# Patient Record
Sex: FEMALE | Race: WHITE | NOT HISPANIC OR LATINO | Employment: UNEMPLOYED | URBAN - METROPOLITAN AREA
[De-identification: names, ages, dates, MRNs, and addresses within clinical notes are randomized per-mention and may not be internally consistent; named-entity substitution may affect disease eponyms.]

---

## 2024-01-29 ENCOUNTER — LAB VISIT (OUTPATIENT)
Dept: LAB | Facility: HOSPITAL | Age: 29
End: 2024-01-29
Attending: MIDWIFE
Payer: MEDICAID

## 2024-01-29 ENCOUNTER — OFFICE VISIT (OUTPATIENT)
Dept: OBSTETRICS AND GYNECOLOGY | Facility: CLINIC | Age: 29
End: 2024-01-29
Payer: MEDICAID

## 2024-01-29 VITALS
BODY MASS INDEX: 21.28 KG/M2 | HEIGHT: 66 IN | SYSTOLIC BLOOD PRESSURE: 92 MMHG | DIASTOLIC BLOOD PRESSURE: 61 MMHG | WEIGHT: 132.38 LBS

## 2024-01-29 DIAGNOSIS — Z32.01 POSITIVE PREGNANCY TEST: ICD-10-CM

## 2024-01-29 DIAGNOSIS — Z32.01 POSITIVE PREGNANCY TEST: Primary | ICD-10-CM

## 2024-01-29 DIAGNOSIS — O34.219 PREVIOUS CESAREAN DELIVERY AFFECTING PREGNANCY: ICD-10-CM

## 2024-01-29 LAB
ABO + RH BLD: NORMAL
B-HCG UR QL: POSITIVE
BASOPHILS # BLD AUTO: 0.02 K/UL (ref 0–0.2)
BASOPHILS NFR BLD: 0.3 % (ref 0–1.9)
BLD GP AB SCN CELLS X3 SERPL QL: NORMAL
CTP QC/QA: YES
DIFFERENTIAL METHOD BLD: NORMAL
EOSINOPHIL # BLD AUTO: 0.1 K/UL (ref 0–0.5)
EOSINOPHIL NFR BLD: 0.8 % (ref 0–8)
ERYTHROCYTE [DISTWIDTH] IN BLOOD BY AUTOMATED COUNT: 12.3 % (ref 11.5–14.5)
HAV IGM SERPL QL IA: NORMAL
HBV CORE IGM SERPL QL IA: NORMAL
HBV SURFACE AG SERPL QL IA: NORMAL
HCT VFR BLD AUTO: 38.7 % (ref 37–48.5)
HCV AB SERPL QL IA: NORMAL
HGB BLD-MCNC: 12.4 G/DL (ref 12–16)
HIV 1+2 AB+HIV1 P24 AG SERPL QL IA: NORMAL
IMM GRANULOCYTES # BLD AUTO: 0.02 K/UL (ref 0–0.04)
IMM GRANULOCYTES NFR BLD AUTO: 0.3 % (ref 0–0.5)
LYMPHOCYTES # BLD AUTO: 1.7 K/UL (ref 1–4.8)
LYMPHOCYTES NFR BLD: 28.2 % (ref 18–48)
MCH RBC QN AUTO: 28.2 PG (ref 27–31)
MCHC RBC AUTO-ENTMCNC: 32 G/DL (ref 32–36)
MCV RBC AUTO: 88 FL (ref 82–98)
MONOCYTES # BLD AUTO: 0.5 K/UL (ref 0.3–1)
MONOCYTES NFR BLD: 8.1 % (ref 4–15)
NEUTROPHILS # BLD AUTO: 3.7 K/UL (ref 1.8–7.7)
NEUTROPHILS NFR BLD: 62.3 % (ref 38–73)
NRBC BLD-RTO: 0 /100 WBC
PLATELET # BLD AUTO: 211 K/UL (ref 150–450)
PMV BLD AUTO: 11.1 FL (ref 9.2–12.9)
RBC # BLD AUTO: 4.39 M/UL (ref 4–5.4)
SPECIMEN OUTDATE: NORMAL
WBC # BLD AUTO: 5.93 K/UL (ref 3.9–12.7)

## 2024-01-29 PROCEDURE — 3074F SYST BP LT 130 MM HG: CPT | Mod: CPTII,,, | Performed by: MIDWIFE

## 2024-01-29 PROCEDURE — 99204 OFFICE O/P NEW MOD 45 MIN: CPT | Mod: TH,S$PBB,, | Performed by: MIDWIFE

## 2024-01-29 PROCEDURE — 86762 RUBELLA ANTIBODY: CPT | Performed by: MIDWIFE

## 2024-01-29 PROCEDURE — 87491 CHLMYD TRACH DNA AMP PROBE: CPT | Performed by: MIDWIFE

## 2024-01-29 PROCEDURE — 3008F BODY MASS INDEX DOCD: CPT | Mod: CPTII,,, | Performed by: MIDWIFE

## 2024-01-29 PROCEDURE — 99999 PR PBB SHADOW E&M-NEW PATIENT-LVL II: CPT | Mod: PBBFAC,,, | Performed by: MIDWIFE

## 2024-01-29 PROCEDURE — 83020 HEMOGLOBIN ELECTROPHORESIS: CPT | Performed by: MIDWIFE

## 2024-01-29 PROCEDURE — 1159F MED LIST DOCD IN RCRD: CPT | Mod: CPTII,,, | Performed by: MIDWIFE

## 2024-01-29 PROCEDURE — 99202 OFFICE O/P NEW SF 15 MIN: CPT | Mod: PBBFAC,TH,PN | Performed by: MIDWIFE

## 2024-01-29 PROCEDURE — 87086 URINE CULTURE/COLONY COUNT: CPT | Performed by: MIDWIFE

## 2024-01-29 PROCEDURE — 80074 ACUTE HEPATITIS PANEL: CPT | Performed by: MIDWIFE

## 2024-01-29 PROCEDURE — 86592 SYPHILIS TEST NON-TREP QUAL: CPT | Performed by: MIDWIFE

## 2024-01-29 PROCEDURE — 85025 COMPLETE CBC W/AUTO DIFF WBC: CPT | Performed by: MIDWIFE

## 2024-01-29 PROCEDURE — 99999PBSHW POCT URINE PREGNANCY: Mod: PBBFAC,,,

## 2024-01-29 PROCEDURE — 81025 URINE PREGNANCY TEST: CPT | Mod: PBBFAC,PN | Performed by: MIDWIFE

## 2024-01-29 PROCEDURE — 86850 RBC ANTIBODY SCREEN: CPT | Performed by: MIDWIFE

## 2024-01-29 PROCEDURE — 87389 HIV-1 AG W/HIV-1&-2 AB AG IA: CPT | Performed by: MIDWIFE

## 2024-01-29 PROCEDURE — 3078F DIAST BP <80 MM HG: CPT | Mod: CPTII,,, | Performed by: MIDWIFE

## 2024-01-29 RX ORDER — ONDANSETRON 4 MG/1
4-8 TABLET, ORALLY DISINTEGRATING ORAL EVERY 6 HOURS PRN
Qty: 30 TABLET | Refills: 0 | Status: SHIPPED | OUTPATIENT
Start: 2024-01-29 | End: 2024-05-03

## 2024-01-29 NOTE — PROGRESS NOTES
"CHIEF COMPLAINT:   Patient presents with      Possible Pregnancy        HISTORY OF PRESENT ILLNESS  Rosemary Montes De Oca 28 y.o.  presents for pregnancy risk assessment.   The patient has no complaints today.  Mild nausea/vomiting. No bleeding or pain.  Pregnancy was planned and is desired.   "Antwan" is supportive of pregnancy.  Lives at home with her  and their daughter.  No pets at home.  Works at [employer], currently. 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  Denies domestic abuse.  Denies chemical/pesticide/radiation exposure.  OB history:  C/s x1- cholestasis and breech presentation    LMP: Patient's last menstrual period was 12/15/2023.  EDC: Estimated Date of Delivery: None noted.  EGA: 6w5d per LMP      Health Maintenance   Topic Date Due    Hepatitis C Screening  Never done    Lipid Panel  Never done    HPV Vaccines (1 - 2-dose series) Never done    TETANUS VACCINE  Never done    Pap Smear  Never done       History reviewed. No pertinent past medical history.    Past Surgical History:   Procedure Laterality Date     SECTION         Family History   Problem Relation Age of Onset    Diabetes Paternal Grandmother     Heart failure Paternal Grandmother     Cancer Maternal Grandmother        Social History     Socioeconomic History    Marital status:    Tobacco Use    Smoking status: Never    Smokeless tobacco: Never   Substance and Sexual Activity    Alcohol use: Not Currently    Drug use: Never    Sexual activity: Yes     Partners: Male     Birth control/protection: None       Current Outpatient Medications   Medication Sig Dispense Refill    PNV no.153/FA/om3/dha/epa/fish (PRENATAL GUMMIES ORAL) Take by mouth.       No current facility-administered medications for this visit.       Review of patient's allergies indicates:  No Known Allergies      PHYSICAL EXAM   Vitals:    24 1410   BP: 92/61        PAIN SCALE: 0/10 None    PHYSICAL EXAM    ROS:  GENERAL: No fever, chills, " fatigability or weight loss.  CV: Denies chest pain  PULM: Denies shortness of breath or wheezing.  ABDOMEN: Appetite fine. No weight loss. Denies diarrhea, abdominal pain, hematemesis or blood in stool.  URINARY: No flank pain, dysuria or hematuria.  REPRODUCTIVE: No abnormal vaginal bleeding.       PE:   APPEARANCE: Well nourished, well developed, in no acute distress  CHEST: Clear to auscultation bilaterally  CV: Regular rate and rhythm  BREASTS: Symmetrical, no skin changes or visible lesions. No palpable masses, nipple discharge or adenopathy bilaterally.  ABDOMEN: Soft. No tenderness or masses. No hepatosplenomegaly. No hernias  PELVIC:   VULVA: No lesions. Normal female genitalia.  URETHRAL MEATUS: Normal size and location, no lesions, no prolapse.  URETHRA: No masses, tenderness, prolapse or scarring.  VAGINA: Moist and well rugated, no discharge, no significant cystocele or rectocele.  CERVIX: No lesions, normal diameter, no stenosis, no cervical motion tenderness.   UTERUS: appropriate to gestational size, regular shape, mobile, non-tender, normal position, good support.  ADNEXA: No masses, tenderness or CDS nodularity.  ANUS PERINEUM: No lesions, no relaxation, no external hemorrhoids.     UPT +    A/P:     -      Patient was counseled today on A.C.S. Pap guidelines and recommendations for yearly pelvic exams, mammograms and monthly self breast exams; to see her PCP for other health maintenance and pregnancy. Last pap smear in August 2021 with beginning of last pregnancy, NILM per pt. Will collect pap smear postpartum.   -      Patient's medications and medical history reviewed with patient and implications in pregnancy.   Previous c/s x1- DORIS obtained from [facility] in [place], [place] with Dr. Cassidy Lopez   -      Pregnancy course discussed and 'AtoZ' book given.   -     Oriented to practice including CNM collaboration.   -     New OB labs today  - RTC x 2 weeks for initial OB and dating US

## 2024-01-30 LAB
BACTERIA UR CULT: NORMAL
BACTERIA UR CULT: NORMAL
HGB A2 MFR BLD HPLC: 2.6 % (ref 2.2–3.2)
HGB FRACT BLD ELPH-IMP: NORMAL
HGB FRACT BLD ELPH-IMP: NORMAL
RPR SER QL: NORMAL
RUBV IGG SER-ACNC: 16.3 IU/ML
RUBV IGG SER-IMP: REACTIVE

## 2024-02-02 LAB
C TRACH DNA SPEC QL NAA+PROBE: NOT DETECTED
N GONORRHOEA DNA SPEC QL NAA+PROBE: NOT DETECTED

## 2024-02-14 ENCOUNTER — PROCEDURE VISIT (OUTPATIENT)
Dept: OBSTETRICS AND GYNECOLOGY | Facility: CLINIC | Age: 29
End: 2024-02-14
Payer: MEDICAID

## 2024-02-14 ENCOUNTER — INITIAL PRENATAL (OUTPATIENT)
Dept: OBSTETRICS AND GYNECOLOGY | Facility: CLINIC | Age: 29
End: 2024-02-14
Payer: MEDICAID

## 2024-02-14 VITALS
SYSTOLIC BLOOD PRESSURE: 109 MMHG | DIASTOLIC BLOOD PRESSURE: 63 MMHG | WEIGHT: 134.25 LBS | BODY MASS INDEX: 21.67 KG/M2

## 2024-02-14 DIAGNOSIS — Z32.01 POSITIVE PREGNANCY TEST: ICD-10-CM

## 2024-02-14 DIAGNOSIS — O34.219 PREVIOUS CESAREAN DELIVERY AFFECTING PREGNANCY: Primary | ICD-10-CM

## 2024-02-14 PROCEDURE — 99211 OFF/OP EST MAY X REQ PHY/QHP: CPT | Mod: PBBFAC,25,TH,PN | Performed by: MIDWIFE

## 2024-02-14 PROCEDURE — 99999 PR PBB SHADOW E&M-EST. PATIENT-LVL I: CPT | Mod: PBBFAC,,, | Performed by: MIDWIFE

## 2024-02-14 PROCEDURE — 99212 OFFICE O/P EST SF 10 MIN: CPT | Mod: TH,S$PBB,, | Performed by: MIDWIFE

## 2024-02-14 PROCEDURE — 76801 OB US < 14 WKS SINGLE FETUS: CPT | Mod: PBBFAC,PN | Performed by: OBSTETRICS & GYNECOLOGY

## 2024-02-14 NOTE — PROGRESS NOTES
28 y.o. female  at 9w0d   Pt here for initial OB and dating US   Denies VB, LOF or cramping  Doing well without concerns, still with some first trimester symptoms but coping well     Previous  delivery affecting pregnancy  -Prenatal labs reviewed   - DORIS not received, will re-fax   - would like TOLAC-once op report received, will review with MD   - routine PNC  -genetic testing declined today        Reviewed warning signs, pregnancy precautions and how/when to call.  RTC x 4 wks, call or present sooner prn.

## 2024-03-12 ENCOUNTER — PATIENT MESSAGE (OUTPATIENT)
Dept: ADMINISTRATIVE | Facility: OTHER | Age: 29
End: 2024-03-12
Payer: MEDICAID

## 2024-03-12 ENCOUNTER — ROUTINE PRENATAL (OUTPATIENT)
Dept: OBSTETRICS AND GYNECOLOGY | Facility: CLINIC | Age: 29
End: 2024-03-12
Payer: MEDICAID

## 2024-03-12 VITALS
WEIGHT: 135.94 LBS | BODY MASS INDEX: 21.94 KG/M2 | DIASTOLIC BLOOD PRESSURE: 60 MMHG | SYSTOLIC BLOOD PRESSURE: 100 MMHG

## 2024-03-12 DIAGNOSIS — O34.219 PREVIOUS CESAREAN DELIVERY AFFECTING PREGNANCY: Primary | ICD-10-CM

## 2024-03-12 PROCEDURE — 99999 PR PBB SHADOW E&M-EST. PATIENT-LVL II: CPT | Mod: PBBFAC,,, | Performed by: MIDWIFE

## 2024-03-12 PROCEDURE — 99212 OFFICE O/P EST SF 10 MIN: CPT | Mod: PBBFAC,TH,PN | Performed by: MIDWIFE

## 2024-03-12 PROCEDURE — 99213 OFFICE O/P EST LOW 20 MIN: CPT | Mod: TH,S$PBB,, | Performed by: MIDWIFE

## 2024-03-12 NOTE — Clinical Note
Please review for TOLAC approval. Previous c/s x1 for cholestasis and breech presentation. Delivery record in media.  Lise

## 2024-03-12 NOTE — PROGRESS NOTES
28 y.o. female  at 12w6d   Denies VB, LOF or cramping  Doing well without concerns, nausea has been easing up the past few days   TW.5 lbs     Previous  delivery affecting pregnancy  -   desires TOLAC, records from previous c/s received and uploaded to media, will send to lester BAIRES for TOLAC approval.   - routine PNC  -signed up for [employer] today          Reviewed warning signs, pregnancy precautions and how/when to call.  RTC x 4 wks, call or present sooner prn.

## 2024-04-03 ENCOUNTER — PATIENT MESSAGE (OUTPATIENT)
Dept: OTHER | Facility: OTHER | Age: 29
End: 2024-04-03
Payer: MEDICAID

## 2024-04-10 ENCOUNTER — TELEPHONE (OUTPATIENT)
Dept: OBSTETRICS AND GYNECOLOGY | Facility: CLINIC | Age: 29
End: 2024-04-10
Payer: MEDICAID

## 2024-04-10 ENCOUNTER — PATIENT MESSAGE (OUTPATIENT)
Dept: OTHER | Facility: OTHER | Age: 29
End: 2024-04-10
Payer: MEDICAID

## 2024-04-10 NOTE — TELEPHONE ENCOUNTER
----- Message from Pastora Parker sent at 4/10/2024  8:46 AM CDT -----  Contact: Rosemary Riley is calling to speak to the nurse regarding her scheduled appointment for today 04/10, please give her a call at     Thanks  LJ

## 2024-04-16 ENCOUNTER — ROUTINE PRENATAL (OUTPATIENT)
Dept: OBSTETRICS AND GYNECOLOGY | Facility: CLINIC | Age: 29
End: 2024-04-16
Payer: MEDICAID

## 2024-04-16 VITALS
DIASTOLIC BLOOD PRESSURE: 58 MMHG | SYSTOLIC BLOOD PRESSURE: 108 MMHG | BODY MASS INDEX: 23.36 KG/M2 | WEIGHT: 144.75 LBS

## 2024-04-16 DIAGNOSIS — O34.219 PREVIOUS CESAREAN DELIVERY AFFECTING PREGNANCY: Primary | ICD-10-CM

## 2024-04-16 DIAGNOSIS — Z36.89 ENCOUNTER FOR FETAL ANATOMIC SURVEY: ICD-10-CM

## 2024-04-16 PROCEDURE — 99999 PR PBB SHADOW E&M-EST. PATIENT-LVL II: CPT | Mod: PBBFAC,,, | Performed by: MIDWIFE

## 2024-04-16 PROCEDURE — 99213 OFFICE O/P EST LOW 20 MIN: CPT | Mod: TH,S$PBB,, | Performed by: MIDWIFE

## 2024-04-16 PROCEDURE — 99212 OFFICE O/P EST SF 10 MIN: CPT | Mod: PBBFAC,TH,PN | Performed by: MIDWIFE

## 2024-04-16 NOTE — PROGRESS NOTES
28 y.o. female  at 17w6d  Denies VB, LOF or cramping  Denies pregnancy concerns/complaints.   C/o painful intercourse, has been since delivery of her first child. Temporarily improved with cessation of breast feeding, but since then, has returned. Does report lack of moisture. Recommendations including use of personal lubricant or coconut oil. May place referral to pelvic floor therapy if problems persist   TW.5 lbs      Previous  delivery affecting pregnancy  -   desires TOLAC, records received included a delivery summary, however not the operative report from previous OBGYN. Will re-fax requesting specifically op report.   - plan for TOLAC consult with MD at 32w  -continue routine PNC  -anatomy US in 2 weeks            Reviewed warning signs, pregnancy precautions and how/when to call.  RTC x 2 wks, call or present sooner prn.

## 2024-05-01 ENCOUNTER — PATIENT MESSAGE (OUTPATIENT)
Dept: OTHER | Facility: OTHER | Age: 29
End: 2024-05-01
Payer: MEDICAID

## 2024-05-03 ENCOUNTER — PROCEDURE VISIT (OUTPATIENT)
Dept: OBSTETRICS AND GYNECOLOGY | Facility: CLINIC | Age: 29
End: 2024-05-03
Payer: MEDICAID

## 2024-05-03 ENCOUNTER — ROUTINE PRENATAL (OUTPATIENT)
Dept: OBSTETRICS AND GYNECOLOGY | Facility: CLINIC | Age: 29
End: 2024-05-03
Payer: MEDICAID

## 2024-05-03 VITALS
DIASTOLIC BLOOD PRESSURE: 60 MMHG | WEIGHT: 146.38 LBS | BODY MASS INDEX: 23.63 KG/M2 | SYSTOLIC BLOOD PRESSURE: 108 MMHG

## 2024-05-03 DIAGNOSIS — Z32.01 POSITIVE PREGNANCY TEST: ICD-10-CM

## 2024-05-03 DIAGNOSIS — O34.219 PREVIOUS CESAREAN DELIVERY AFFECTING PREGNANCY: Primary | ICD-10-CM

## 2024-05-03 DIAGNOSIS — Z87.59 HISTORY OF CHOLESTASIS DURING PREGNANCY: ICD-10-CM

## 2024-05-03 DIAGNOSIS — Z87.19 HISTORY OF CHOLESTASIS DURING PREGNANCY: ICD-10-CM

## 2024-05-03 PROCEDURE — 99213 OFFICE O/P EST LOW 20 MIN: CPT | Mod: TH,S$PBB,, | Performed by: MIDWIFE

## 2024-05-03 PROCEDURE — 76805 OB US >/= 14 WKS SNGL FETUS: CPT | Mod: PBBFAC,PN | Performed by: OBSTETRICS & GYNECOLOGY

## 2024-05-03 PROCEDURE — 99999 PR PBB SHADOW E&M-EST. PATIENT-LVL II: CPT | Mod: PBBFAC,,, | Performed by: MIDWIFE

## 2024-05-03 PROCEDURE — 99212 OFFICE O/P EST SF 10 MIN: CPT | Mod: PBBFAC,TH,PN,25 | Performed by: MIDWIFE

## 2024-05-03 NOTE — PROGRESS NOTES
28 y.o. female  at 20w2d   +FM, denies VB, LOF or cramping  Doing well without concerns.  TW lbs     Previous  delivery affecting pregnancy  -   desires TOLAC, records received included a delivery summary, however not the operative report from previous OBGYN. Re-faxed DORIS requesting specifically op report, but still not received  - plan for TOLAC consult with MD at 32w  -continue routine PNC  -anatomy US today, cephalic presentation, posterior placenta, placental edge to cervical os distance 87mm, 3vc, normal cord insertion, EFW 13oz, 33%ile, cervical length 34mm, fetal anatomic survey complete per MFM impression     History of cholestasis during pregnancy  - continue to monitor for symptoms this pregnancy        Reviewed warning signs, normal FM,  labor precautions and how/when to call.  RTC x 4 wks, call or present sooner prn.

## 2024-05-29 ENCOUNTER — PATIENT MESSAGE (OUTPATIENT)
Dept: OTHER | Facility: OTHER | Age: 29
End: 2024-05-29
Payer: MEDICAID

## 2024-05-30 ENCOUNTER — ROUTINE PRENATAL (OUTPATIENT)
Dept: OBSTETRICS AND GYNECOLOGY | Facility: CLINIC | Age: 29
End: 2024-05-30
Payer: MEDICAID

## 2024-05-30 VITALS
SYSTOLIC BLOOD PRESSURE: 114 MMHG | DIASTOLIC BLOOD PRESSURE: 64 MMHG | BODY MASS INDEX: 24.94 KG/M2 | WEIGHT: 154.56 LBS

## 2024-05-30 DIAGNOSIS — Z87.19 HISTORY OF CHOLESTASIS DURING PREGNANCY: ICD-10-CM

## 2024-05-30 DIAGNOSIS — O34.219 PREVIOUS CESAREAN DELIVERY AFFECTING PREGNANCY: Primary | ICD-10-CM

## 2024-05-30 DIAGNOSIS — Z87.59 HISTORY OF CHOLESTASIS DURING PREGNANCY: ICD-10-CM

## 2024-05-30 PROCEDURE — 99212 OFFICE O/P EST SF 10 MIN: CPT | Mod: PBBFAC,TH,PN | Performed by: MIDWIFE

## 2024-05-30 PROCEDURE — 99999 PR PBB SHADOW E&M-EST. PATIENT-LVL II: CPT | Mod: PBBFAC,,, | Performed by: MIDWIFE

## 2024-05-30 PROCEDURE — 99213 OFFICE O/P EST LOW 20 MIN: CPT | Mod: TH,S$PBB,, | Performed by: MIDWIFE

## 2024-05-30 NOTE — PROGRESS NOTES
28 y.o. female  at 24w1d   +FM, denies VB, LOF or cramping  Doing well without concerns.  TW lbs      Previous  delivery affecting pregnancy  -   desires TOLAC, records received included a delivery summary, however not the operative report from previous OBGYN. Re-faxed DORIS requesting specifically op report, but still not received. Discussed with lester BAIRES. Ok for SCOTTY.   -continue routine PNC  -3rd trimester labs discussed, will complete at nv   - Plans for birth: NCB but not opposed to epidural, also plans to BF again    History of cholestasis during pregnancy  - continue to monitor for symptoms this pregnancy         Reviewed warning signs, normal FM,  labor precautions and how/when to call.  RTC x 4 wks, call or present sooner prn.

## 2024-06-12 ENCOUNTER — PATIENT MESSAGE (OUTPATIENT)
Dept: OTHER | Facility: OTHER | Age: 29
End: 2024-06-12
Payer: MEDICAID

## 2024-06-26 ENCOUNTER — PATIENT MESSAGE (OUTPATIENT)
Dept: OTHER | Facility: OTHER | Age: 29
End: 2024-06-26
Payer: MEDICAID

## 2024-06-27 ENCOUNTER — ROUTINE PRENATAL (OUTPATIENT)
Dept: OBSTETRICS AND GYNECOLOGY | Facility: CLINIC | Age: 29
End: 2024-06-27
Payer: MEDICAID

## 2024-06-27 ENCOUNTER — LAB VISIT (OUTPATIENT)
Dept: LAB | Facility: HOSPITAL | Age: 29
End: 2024-06-27
Attending: MIDWIFE
Payer: MEDICAID

## 2024-06-27 VITALS
WEIGHT: 159.94 LBS | SYSTOLIC BLOOD PRESSURE: 116 MMHG | DIASTOLIC BLOOD PRESSURE: 62 MMHG | BODY MASS INDEX: 25.82 KG/M2

## 2024-06-27 DIAGNOSIS — O34.219 PREVIOUS CESAREAN DELIVERY AFFECTING PREGNANCY: Primary | ICD-10-CM

## 2024-06-27 DIAGNOSIS — Z87.59 HISTORY OF CHOLESTASIS DURING PREGNANCY: ICD-10-CM

## 2024-06-27 DIAGNOSIS — O34.219 PREVIOUS CESAREAN DELIVERY AFFECTING PREGNANCY: ICD-10-CM

## 2024-06-27 DIAGNOSIS — Z87.19 HISTORY OF CHOLESTASIS DURING PREGNANCY: ICD-10-CM

## 2024-06-27 LAB
BASOPHILS # BLD AUTO: 0.03 K/UL (ref 0–0.2)
BASOPHILS NFR BLD: 0.3 % (ref 0–1.9)
DIFFERENTIAL METHOD BLD: ABNORMAL
EOSINOPHIL # BLD AUTO: 0.1 K/UL (ref 0–0.5)
EOSINOPHIL NFR BLD: 0.6 % (ref 0–8)
ERYTHROCYTE [DISTWIDTH] IN BLOOD BY AUTOMATED COUNT: 12.2 % (ref 11.5–14.5)
GLUCOSE SERPL-MCNC: 93 MG/DL (ref 70–140)
HCT VFR BLD AUTO: 32.1 % (ref 37–48.5)
HGB BLD-MCNC: 10.4 G/DL (ref 12–16)
HIV 1+2 AB+HIV1 P24 AG SERPL QL IA: NORMAL
IMM GRANULOCYTES # BLD AUTO: 0.05 K/UL (ref 0–0.04)
IMM GRANULOCYTES NFR BLD AUTO: 0.5 % (ref 0–0.5)
LYMPHOCYTES # BLD AUTO: 1.7 K/UL (ref 1–4.8)
LYMPHOCYTES NFR BLD: 17.8 % (ref 18–48)
MCH RBC QN AUTO: 29.3 PG (ref 27–31)
MCHC RBC AUTO-ENTMCNC: 32.4 G/DL (ref 32–36)
MCV RBC AUTO: 90 FL (ref 82–98)
MONOCYTES # BLD AUTO: 0.8 K/UL (ref 0.3–1)
MONOCYTES NFR BLD: 7.9 % (ref 4–15)
NEUTROPHILS # BLD AUTO: 7 K/UL (ref 1.8–7.7)
NEUTROPHILS NFR BLD: 72.9 % (ref 38–73)
NRBC BLD-RTO: 0 /100 WBC
PLATELET # BLD AUTO: 187 K/UL (ref 150–450)
PMV BLD AUTO: 12.6 FL (ref 9.2–12.9)
RBC # BLD AUTO: 3.55 M/UL (ref 4–5.4)
TREPONEMA PALLIDUM IGG+IGM AB [PRESENCE] IN SERUM OR PLASMA BY IMMUNOASSAY: NONREACTIVE
WBC # BLD AUTO: 9.56 K/UL (ref 3.9–12.7)

## 2024-06-27 PROCEDURE — 85025 COMPLETE CBC W/AUTO DIFF WBC: CPT | Performed by: MIDWIFE

## 2024-06-27 PROCEDURE — 86593 SYPHILIS TEST NON-TREP QUANT: CPT | Performed by: MIDWIFE

## 2024-06-27 PROCEDURE — 82950 GLUCOSE TEST: CPT | Performed by: MIDWIFE

## 2024-06-27 PROCEDURE — 36415 COLL VENOUS BLD VENIPUNCTURE: CPT | Mod: PN | Performed by: MIDWIFE

## 2024-06-27 PROCEDURE — 87389 HIV-1 AG W/HIV-1&-2 AB AG IA: CPT | Performed by: MIDWIFE

## 2024-06-27 PROCEDURE — 99213 OFFICE O/P EST LOW 20 MIN: CPT | Mod: TH,S$PBB,, | Performed by: MIDWIFE

## 2024-06-27 PROCEDURE — 99212 OFFICE O/P EST SF 10 MIN: CPT | Mod: PBBFAC,TH,PN | Performed by: MIDWIFE

## 2024-06-27 PROCEDURE — 99999 PR PBB SHADOW E&M-EST. PATIENT-LVL II: CPT | Mod: PBBFAC,,, | Performed by: MIDWIFE

## 2024-06-27 NOTE — PROGRESS NOTES
28 y.o. female  at 28w1d   Reports + FM, denies VB, LOF or CTX  Doing well without concerns. Has had some intermittent itching, she is not concerned currently. STRESSED importance of watching symptoms and recommended skin moisturizer after showering and throughout the day.       TW lbs   28wk labs today (O POS)  Rhogam not needed  Tdap declines    Birth control options discussed she declines postpartum   Will start visits every 2 weeks    History of cholestasis during pregnancy  - continue to monitor for symptoms this pregnancy     Previous  delivery affecting pregnancy  -   desires TOLAC, records received included a delivery summary, however not the operative report from previous OBGYN. Re-faxed DORIS requesting specifically op report, but still not received. Discussed with collab MD. Ok for SCOTTY.   -continue routine PNC       Reviewed warning signs, normal FKCs,  labor precautions and how/when to call.  RTC x 2 wks, call or present sooner prn.     DOUGLAS Ziegler

## 2024-07-01 ENCOUNTER — PATIENT MESSAGE (OUTPATIENT)
Dept: OBSTETRICS AND GYNECOLOGY | Facility: HOSPITAL | Age: 29
End: 2024-07-01
Payer: MEDICAID

## 2024-07-01 PROBLEM — O99.013 ANEMIA AFFECTING PREGNANCY IN THIRD TRIMESTER: Status: ACTIVE | Noted: 2024-07-01

## 2024-07-01 RX ORDER — FERROUS SULFATE 325(65) MG
325 TABLET ORAL DAILY
Qty: 30 TABLET | Refills: 2 | Status: SHIPPED | OUTPATIENT
Start: 2024-07-01

## 2024-07-10 ENCOUNTER — PATIENT MESSAGE (OUTPATIENT)
Dept: OTHER | Facility: OTHER | Age: 29
End: 2024-07-10
Payer: MEDICAID

## 2024-07-10 ENCOUNTER — ROUTINE PRENATAL (OUTPATIENT)
Dept: OBSTETRICS AND GYNECOLOGY | Facility: CLINIC | Age: 29
End: 2024-07-10
Payer: MEDICAID

## 2024-07-10 VITALS — WEIGHT: 161.38 LBS | BODY MASS INDEX: 26.05 KG/M2 | DIASTOLIC BLOOD PRESSURE: 62 MMHG | SYSTOLIC BLOOD PRESSURE: 94 MMHG

## 2024-07-10 DIAGNOSIS — Z87.59 HISTORY OF CHOLESTASIS DURING PREGNANCY: ICD-10-CM

## 2024-07-10 DIAGNOSIS — O99.013 ANEMIA AFFECTING PREGNANCY IN THIRD TRIMESTER: ICD-10-CM

## 2024-07-10 DIAGNOSIS — O34.219 PREVIOUS CESAREAN DELIVERY AFFECTING PREGNANCY: Primary | ICD-10-CM

## 2024-07-10 DIAGNOSIS — Z87.19 HISTORY OF CHOLESTASIS DURING PREGNANCY: ICD-10-CM

## 2024-07-10 PROCEDURE — 99214 OFFICE O/P EST MOD 30 MIN: CPT | Mod: TH,S$PBB,UC, | Performed by: ADVANCED PRACTICE MIDWIFE

## 2024-07-10 PROCEDURE — 99212 OFFICE O/P EST SF 10 MIN: CPT | Mod: PBBFAC,TH | Performed by: ADVANCED PRACTICE MIDWIFE

## 2024-07-10 PROCEDURE — 99999 PR PBB SHADOW E&M-EST. PATIENT-LVL II: CPT | Mod: PBBFAC,,, | Performed by: ADVANCED PRACTICE MIDWIFE

## 2024-07-10 NOTE — PROGRESS NOTES
28 y.o. female  at 30w0d   Reports + FM, denies VB, LOF or CTX  Doing well without concerns     TW lbs   Reviewed 28wk lab results (O POS)  Anemia-slight, encouraged to increase iron in diet  Passed GTT  Tdap declines    Previous  delivery affecting pregnancy  - approved for SCOTTY    History of cholestasis during pregnancy    Anemia affecting pregnancy in third trimester  - Encouraged to increase dietary iron.          Reviewed warning signs, normal FKCs,  labor precautions and how/when to call.  RTC x 2 wks, call or present sooner prn.     DOUGLAS Ziegler

## 2024-07-24 ENCOUNTER — PATIENT MESSAGE (OUTPATIENT)
Dept: OTHER | Facility: OTHER | Age: 29
End: 2024-07-24
Payer: MEDICAID

## 2024-07-30 ENCOUNTER — ROUTINE PRENATAL (OUTPATIENT)
Dept: OBSTETRICS AND GYNECOLOGY | Facility: CLINIC | Age: 29
End: 2024-07-30
Payer: MEDICAID

## 2024-07-30 ENCOUNTER — PATIENT MESSAGE (OUTPATIENT)
Dept: OBSTETRICS AND GYNECOLOGY | Facility: CLINIC | Age: 29
End: 2024-07-30

## 2024-07-30 VITALS — WEIGHT: 170 LBS | SYSTOLIC BLOOD PRESSURE: 102 MMHG | BODY MASS INDEX: 27.43 KG/M2 | DIASTOLIC BLOOD PRESSURE: 66 MMHG

## 2024-07-30 DIAGNOSIS — Z87.19 HISTORY OF CHOLESTASIS DURING PREGNANCY: ICD-10-CM

## 2024-07-30 DIAGNOSIS — O34.219 PREVIOUS CESAREAN DELIVERY AFFECTING PREGNANCY: ICD-10-CM

## 2024-07-30 DIAGNOSIS — O99.013 ANEMIA AFFECTING PREGNANCY IN THIRD TRIMESTER: Primary | ICD-10-CM

## 2024-07-30 DIAGNOSIS — Z87.59 HISTORY OF CHOLESTASIS DURING PREGNANCY: ICD-10-CM

## 2024-07-30 PROCEDURE — 99999 PR PBB SHADOW E&M-EST. PATIENT-LVL II: CPT | Mod: PBBFAC,,, | Performed by: MIDWIFE

## 2024-07-30 PROCEDURE — 99212 OFFICE O/P EST SF 10 MIN: CPT | Mod: PBBFAC,TH,PN | Performed by: MIDWIFE

## 2024-07-30 PROCEDURE — 99213 OFFICE O/P EST LOW 20 MIN: CPT | Mod: TH,S$PBB,UC, | Performed by: MIDWIFE

## 2024-07-30 NOTE — PROGRESS NOTES
28 y.o. female  at 24w1d   +FM, denies VB, LOF or cramping  Doing well overall, still considering paying out of pocket for chiropractor but will also look further into one that covers her insurance   TW lbs   Declines Tdap     Previous  delivery affecting pregnancy  - desires TOLAC, ok'd by MD   -continue routine PNC  - Plans for birth: NCB but not opposed to epidural, also plans to BF again (has 2 pumps at home)  -will work on birth plan      History of cholestasis during pregnancy  - continue to monitor for symptoms this pregnancy     Anemia affecting pregnancy in third trimester    - increase dietary iron  -iron supplements daily     Reviewed warning signs, normal FM,  labor precautions and how/when to call.  RTC x 2 wks, call or present sooner prn.

## 2024-08-14 ENCOUNTER — PATIENT MESSAGE (OUTPATIENT)
Dept: OTHER | Facility: OTHER | Age: 29
End: 2024-08-14
Payer: MEDICAID

## 2024-08-15 ENCOUNTER — ROUTINE PRENATAL (OUTPATIENT)
Dept: OBSTETRICS AND GYNECOLOGY | Facility: CLINIC | Age: 29
End: 2024-08-15
Payer: MEDICAID

## 2024-08-15 VITALS
SYSTOLIC BLOOD PRESSURE: 110 MMHG | BODY MASS INDEX: 27.83 KG/M2 | DIASTOLIC BLOOD PRESSURE: 76 MMHG | WEIGHT: 172.38 LBS

## 2024-08-15 DIAGNOSIS — O99.013 ANEMIA AFFECTING PREGNANCY IN THIRD TRIMESTER: ICD-10-CM

## 2024-08-15 DIAGNOSIS — Z87.59 HISTORY OF CHOLESTASIS DURING PREGNANCY: ICD-10-CM

## 2024-08-15 DIAGNOSIS — Z87.19 HISTORY OF CHOLESTASIS DURING PREGNANCY: ICD-10-CM

## 2024-08-15 DIAGNOSIS — O34.219 PREVIOUS CESAREAN DELIVERY AFFECTING PREGNANCY: Primary | ICD-10-CM

## 2024-08-15 PROCEDURE — 99213 OFFICE O/P EST LOW 20 MIN: CPT | Mod: TH,S$PBB,UC, | Performed by: MIDWIFE

## 2024-08-15 PROCEDURE — 99212 OFFICE O/P EST SF 10 MIN: CPT | Mod: PBBFAC,TH,PN | Performed by: MIDWIFE

## 2024-08-15 PROCEDURE — 99999 PR PBB SHADOW E&M-EST. PATIENT-LVL II: CPT | Mod: PBBFAC,,, | Performed by: MIDWIFE

## 2024-08-27 ENCOUNTER — PATIENT MESSAGE (OUTPATIENT)
Dept: OBSTETRICS AND GYNECOLOGY | Facility: CLINIC | Age: 29
End: 2024-08-27
Payer: MEDICAID

## 2024-08-28 ENCOUNTER — ROUTINE PRENATAL (OUTPATIENT)
Dept: OBSTETRICS AND GYNECOLOGY | Facility: CLINIC | Age: 29
End: 2024-08-28
Payer: MEDICAID

## 2024-08-28 ENCOUNTER — PROCEDURE VISIT (OUTPATIENT)
Dept: OBSTETRICS AND GYNECOLOGY | Facility: CLINIC | Age: 29
End: 2024-08-28
Payer: MEDICAID

## 2024-08-28 VITALS
BODY MASS INDEX: 28.43 KG/M2 | SYSTOLIC BLOOD PRESSURE: 110 MMHG | DIASTOLIC BLOOD PRESSURE: 68 MMHG | WEIGHT: 176.13 LBS

## 2024-08-28 DIAGNOSIS — O34.219 PREVIOUS CESAREAN DELIVERY AFFECTING PREGNANCY: Primary | ICD-10-CM

## 2024-08-28 DIAGNOSIS — O99.013 ANEMIA AFFECTING PREGNANCY IN THIRD TRIMESTER: ICD-10-CM

## 2024-08-28 DIAGNOSIS — Z36.89 ENCOUNTER FOR FETAL ANATOMIC SURVEY: ICD-10-CM

## 2024-08-28 PROCEDURE — 99999 PR PBB SHADOW E&M-EST. PATIENT-LVL II: CPT | Mod: PBBFAC,,, | Performed by: MIDWIFE

## 2024-08-28 PROCEDURE — 87653 STREP B DNA AMP PROBE: CPT | Performed by: MIDWIFE

## 2024-08-28 PROCEDURE — 99213 OFFICE O/P EST LOW 20 MIN: CPT | Mod: TH,S$PBB,, | Performed by: MIDWIFE

## 2024-08-28 PROCEDURE — 99212 OFFICE O/P EST SF 10 MIN: CPT | Mod: PBBFAC,TH,PN,25 | Performed by: MIDWIFE

## 2024-08-28 PROCEDURE — 76816 OB US FOLLOW-UP PER FETUS: CPT | Mod: PBBFAC,PN | Performed by: OBSTETRICS & GYNECOLOGY

## 2024-08-28 RX ORDER — FERROUS SULFATE 325(65) MG
325 TABLET ORAL DAILY
Qty: 30 TABLET | Refills: 2 | Status: SHIPPED | OUTPATIENT
Start: 2024-08-28

## 2024-08-28 NOTE — PROGRESS NOTES
28 y.o. female  at 37w0d  +FM, denies VB, LOF or cramping  Doing well overall, voices no complaints  Ok to begin RRL tea and primrose oil if desired  Encouraged daily exercise or hip circles on the ball   Breast pump order given   TW lbs      Previous  delivery affecting pregnancy  - desires TOLAC, ok'd by MD   -continue routine PNC  - GBS collection today. The skin of the suprapubic region appears clean, dry, and intact.   - growth scan today, cephalic presentation, MVP 5.4cm, EFW 35%ile, 6lb 8oz, AC 40%ile     History of cholestasis during pregnancy  - continue to monitor for symptoms this pregnancy      Anemia affecting pregnancy in third trimester   -iron supplements daily, refill rx per pt request      Reviewed warning signs, normal FM,  labor precautions and how/when to call.  RTC x 1 wk, call or present sooner prn.

## 2024-08-30 LAB — GROUP B STREPTOCOCCUS, PCR: NEGATIVE

## 2024-09-06 ENCOUNTER — ROUTINE PRENATAL (OUTPATIENT)
Dept: OBSTETRICS AND GYNECOLOGY | Facility: CLINIC | Age: 29
End: 2024-09-06
Payer: MEDICAID

## 2024-09-06 ENCOUNTER — LAB VISIT (OUTPATIENT)
Dept: LAB | Facility: HOSPITAL | Age: 29
End: 2024-09-06
Attending: MIDWIFE
Payer: MEDICAID

## 2024-09-06 VITALS
DIASTOLIC BLOOD PRESSURE: 78 MMHG | SYSTOLIC BLOOD PRESSURE: 116 MMHG | WEIGHT: 180.56 LBS | BODY MASS INDEX: 29.14 KG/M2

## 2024-09-06 DIAGNOSIS — Z87.59 HISTORY OF CHOLESTASIS DURING PREGNANCY: ICD-10-CM

## 2024-09-06 DIAGNOSIS — Z87.19 HISTORY OF CHOLESTASIS DURING PREGNANCY: ICD-10-CM

## 2024-09-06 DIAGNOSIS — L50.9 URTICARIA: ICD-10-CM

## 2024-09-06 DIAGNOSIS — O34.219 PREVIOUS CESAREAN DELIVERY AFFECTING PREGNANCY: Primary | ICD-10-CM

## 2024-09-06 DIAGNOSIS — O99.013 ANEMIA AFFECTING PREGNANCY IN THIRD TRIMESTER: ICD-10-CM

## 2024-09-06 LAB
ALBUMIN SERPL BCP-MCNC: 3.2 G/DL (ref 3.5–5.2)
ALP SERPL-CCNC: 200 U/L (ref 55–135)
ALT SERPL W/O P-5'-P-CCNC: 23 U/L (ref 10–44)
ANION GAP SERPL CALC-SCNC: 10 MMOL/L (ref 8–16)
AST SERPL-CCNC: 23 U/L (ref 10–40)
BILIRUB SERPL-MCNC: 0.4 MG/DL (ref 0.1–1)
BUN SERPL-MCNC: 12 MG/DL (ref 6–20)
CALCIUM SERPL-MCNC: 9.4 MG/DL (ref 8.7–10.5)
CHLORIDE SERPL-SCNC: 104 MMOL/L (ref 95–110)
CO2 SERPL-SCNC: 21 MMOL/L (ref 23–29)
CREAT SERPL-MCNC: 0.7 MG/DL (ref 0.5–1.4)
EST. GFR  (NO RACE VARIABLE): >60 ML/MIN/1.73 M^2
GLUCOSE SERPL-MCNC: 91 MG/DL (ref 70–110)
POTASSIUM SERPL-SCNC: 4.2 MMOL/L (ref 3.5–5.1)
PROT SERPL-MCNC: 6.6 G/DL (ref 6–8.4)
SODIUM SERPL-SCNC: 135 MMOL/L (ref 136–145)

## 2024-09-06 PROCEDURE — 99212 OFFICE O/P EST SF 10 MIN: CPT | Mod: PBBFAC,TH,PN | Performed by: MIDWIFE

## 2024-09-06 PROCEDURE — 99999 PR PBB SHADOW E&M-EST. PATIENT-LVL II: CPT | Mod: PBBFAC,,, | Performed by: MIDWIFE

## 2024-09-06 PROCEDURE — 82239 BILE ACIDS TOTAL: CPT | Performed by: MIDWIFE

## 2024-09-06 PROCEDURE — 80053 COMPREHEN METABOLIC PANEL: CPT | Performed by: MIDWIFE

## 2024-09-06 NOTE — PROGRESS NOTES
28 y.o. female  at 38w2d  +FM, denies VB, LOF or cramping  Doing well overall, does report new onset of urticaria.   TWlbs   Birth plan reviewed, will upload a copy to media. Pt plans to bring with her day of delivery.      Previous  delivery affecting pregnancy  - desires TOLAC, ok'd by MD   -continue routine PNC  -declines VE today      History of cholestasis during pregnancy  - reports new onset of urticaria, will run bile acids and CMP today      Anemia affecting pregnancy in third trimester   -iron supplements daily     Reviewed warning signs, normal FM, labor precautions and how/when to call.  RTC x 1 wk, call or present sooner prn.

## 2024-09-09 LAB — BILE AC SERPL-SCNC: 15 MCMOL/L

## 2024-09-10 ENCOUNTER — HOSPITAL ENCOUNTER (INPATIENT)
Facility: HOSPITAL | Age: 29
LOS: 2 days | Discharge: HOME | End: 2024-09-12
Attending: OBSTETRICS & GYNECOLOGY | Admitting: OBSTETRICS & GYNECOLOGY
Payer: MEDICAID

## 2024-09-10 ENCOUNTER — ANESTHESIA EVENT (OUTPATIENT)
Dept: OBSTETRICS AND GYNECOLOGY | Facility: HOSPITAL | Age: 29
End: 2024-09-10
Payer: MEDICAID

## 2024-09-10 ENCOUNTER — TELEPHONE (OUTPATIENT)
Dept: OBSTETRICS AND GYNECOLOGY | Facility: CLINIC | Age: 29
End: 2024-09-10
Payer: MEDICAID

## 2024-09-10 ENCOUNTER — ANESTHESIA (OUTPATIENT)
Dept: OBSTETRICS AND GYNECOLOGY | Facility: HOSPITAL | Age: 29
End: 2024-09-10
Payer: MEDICAID

## 2024-09-10 DIAGNOSIS — O34.219 PREVIOUS CESAREAN DELIVERY AFFECTING PREGNANCY: ICD-10-CM

## 2024-09-10 DIAGNOSIS — Z98.891 STATUS POST REPEAT LOW TRANSVERSE CESAREAN SECTION: Primary | ICD-10-CM

## 2024-09-10 DIAGNOSIS — Z98.891 S/P CESAREAN SECTION: ICD-10-CM

## 2024-09-10 DIAGNOSIS — O26.643 CHOLESTASIS DURING PREGNANCY IN THIRD TRIMESTER: ICD-10-CM

## 2024-09-10 LAB
ABO + RH BLD: NORMAL
BASOPHILS # BLD AUTO: 0.03 K/UL (ref 0–0.2)
BASOPHILS NFR BLD: 0.3 % (ref 0–1.9)
BLD GP AB SCN CELLS X3 SERPL QL: NORMAL
DIFFERENTIAL METHOD BLD: ABNORMAL
EOSINOPHIL # BLD AUTO: 0.1 K/UL (ref 0–0.5)
EOSINOPHIL NFR BLD: 0.5 % (ref 0–8)
ERYTHROCYTE [DISTWIDTH] IN BLOOD BY AUTOMATED COUNT: 14.2 % (ref 11.5–14.5)
HCT VFR BLD AUTO: 37.6 % (ref 37–48.5)
HGB BLD-MCNC: 12.5 G/DL (ref 12–16)
HIV 1+2 AB+HIV1 P24 AG SERPL QL IA: NEGATIVE
IMM GRANULOCYTES # BLD AUTO: 0.08 K/UL (ref 0–0.04)
IMM GRANULOCYTES NFR BLD AUTO: 0.8 % (ref 0–0.5)
LYMPHOCYTES # BLD AUTO: 1.6 K/UL (ref 1–4.8)
LYMPHOCYTES NFR BLD: 16.8 % (ref 18–48)
MCH RBC QN AUTO: 28.5 PG (ref 27–31)
MCHC RBC AUTO-ENTMCNC: 33.2 G/DL (ref 32–36)
MCV RBC AUTO: 86 FL (ref 82–98)
MONOCYTES # BLD AUTO: 0.8 K/UL (ref 0.3–1)
MONOCYTES NFR BLD: 8.7 % (ref 4–15)
NEUTROPHILS # BLD AUTO: 7 K/UL (ref 1.8–7.7)
NEUTROPHILS NFR BLD: 72.9 % (ref 38–73)
NRBC BLD-RTO: 0 /100 WBC
PLATELET # BLD AUTO: 148 K/UL (ref 150–450)
PMV BLD AUTO: 12.5 FL (ref 9.2–12.9)
RBC # BLD AUTO: 4.39 M/UL (ref 4–5.4)
WBC # BLD AUTO: 9.56 K/UL (ref 3.9–12.7)

## 2024-09-10 PROCEDURE — 86900 BLOOD TYPING SEROLOGIC ABO: CPT | Performed by: ADVANCED PRACTICE MIDWIFE

## 2024-09-10 PROCEDURE — 71000039 HC RECOVERY, EACH ADD'L HOUR: Performed by: OBSTETRICS & GYNECOLOGY

## 2024-09-10 PROCEDURE — 36004724 HC OB OR TIME LEV III - 1ST 15 MIN: Performed by: OBSTETRICS & GYNECOLOGY

## 2024-09-10 PROCEDURE — 25000003 PHARM REV CODE 250: Performed by: OBSTETRICS & GYNECOLOGY

## 2024-09-10 PROCEDURE — 63600175 PHARM REV CODE 636 W HCPCS: Performed by: NURSE ANESTHETIST, CERTIFIED REGISTERED

## 2024-09-10 PROCEDURE — 36004725 HC OB OR TIME LEV III - EA ADD 15 MIN: Performed by: OBSTETRICS & GYNECOLOGY

## 2024-09-10 PROCEDURE — 88307 TISSUE EXAM BY PATHOLOGIST: CPT | Performed by: PATHOLOGY

## 2024-09-10 PROCEDURE — 63600175 PHARM REV CODE 636 W HCPCS: Performed by: OBSTETRICS & GYNECOLOGY

## 2024-09-10 PROCEDURE — 51702 INSERT TEMP BLADDER CATH: CPT

## 2024-09-10 PROCEDURE — 87389 HIV-1 AG W/HIV-1&-2 AB AG IA: CPT | Performed by: ADVANCED PRACTICE MIDWIFE

## 2024-09-10 PROCEDURE — 86901 BLOOD TYPING SEROLOGIC RH(D): CPT | Performed by: ADVANCED PRACTICE MIDWIFE

## 2024-09-10 PROCEDURE — 11000001 HC ACUTE MED/SURG PRIVATE ROOM

## 2024-09-10 PROCEDURE — 59514 CESAREAN DELIVERY ONLY: CPT | Mod: AT,,, | Performed by: OBSTETRICS & GYNECOLOGY

## 2024-09-10 PROCEDURE — 25000003 PHARM REV CODE 250: Performed by: NURSE ANESTHETIST, CERTIFIED REGISTERED

## 2024-09-10 PROCEDURE — 37000008 HC ANESTHESIA 1ST 15 MINUTES: Performed by: OBSTETRICS & GYNECOLOGY

## 2024-09-10 PROCEDURE — 36415 COLL VENOUS BLD VENIPUNCTURE: CPT | Performed by: ADVANCED PRACTICE MIDWIFE

## 2024-09-10 PROCEDURE — 86593 SYPHILIS TEST NON-TREP QUANT: CPT | Performed by: ADVANCED PRACTICE MIDWIFE

## 2024-09-10 PROCEDURE — 71000033 HC RECOVERY, INTIAL HOUR: Performed by: OBSTETRICS & GYNECOLOGY

## 2024-09-10 PROCEDURE — 85025 COMPLETE CBC W/AUTO DIFF WBC: CPT | Performed by: ADVANCED PRACTICE MIDWIFE

## 2024-09-10 PROCEDURE — 37000009 HC ANESTHESIA EA ADD 15 MINS: Performed by: OBSTETRICS & GYNECOLOGY

## 2024-09-10 RX ORDER — OXYTOCIN 10 [USP'U]/ML
10 INJECTION, SOLUTION INTRAMUSCULAR; INTRAVENOUS ONCE AS NEEDED
Status: COMPLETED | OUTPATIENT
Start: 2024-09-10 | End: 2024-09-10

## 2024-09-10 RX ORDER — ONDANSETRON HYDROCHLORIDE 2 MG/ML
INJECTION, SOLUTION INTRAVENOUS
Status: DISCONTINUED | OUTPATIENT
Start: 2024-09-10 | End: 2024-09-10

## 2024-09-10 RX ORDER — DOCUSATE SODIUM 100 MG/1
100 CAPSULE, LIQUID FILLED ORAL 2 TIMES DAILY
Status: DISCONTINUED | OUTPATIENT
Start: 2024-09-10 | End: 2024-09-12 | Stop reason: HOSPADM

## 2024-09-10 RX ORDER — OXYTOCIN-SODIUM CHLORIDE 0.9% IV SOLN 30 UNIT/500ML 30-0.9/5 UT/ML-%
SOLUTION INTRAVENOUS CONTINUOUS PRN
Status: DISCONTINUED | OUTPATIENT
Start: 2024-09-10 | End: 2024-09-10

## 2024-09-10 RX ORDER — CEFAZOLIN SODIUM 1 G/3ML
INJECTION, POWDER, FOR SOLUTION INTRAMUSCULAR; INTRAVENOUS
Status: DISCONTINUED | OUTPATIENT
Start: 2024-09-10 | End: 2024-09-10

## 2024-09-10 RX ORDER — MISOPROSTOL 200 UG/1
800 TABLET ORAL ONCE AS NEEDED
Status: COMPLETED | OUTPATIENT
Start: 2024-09-10 | End: 2024-09-10

## 2024-09-10 RX ORDER — METHYLERGONOVINE MALEATE 0.2 MG/ML
200 INJECTION INTRAVENOUS ONCE AS NEEDED
Status: COMPLETED | OUTPATIENT
Start: 2024-09-10 | End: 2024-09-10

## 2024-09-10 RX ORDER — SODIUM CITRATE AND CITRIC ACID MONOHYDRATE 334; 500 MG/5ML; MG/5ML
30 SOLUTION ORAL
Status: DISCONTINUED | OUTPATIENT
Start: 2024-09-10 | End: 2024-09-10

## 2024-09-10 RX ORDER — DIPHENHYDRAMINE HCL 25 MG
25 CAPSULE ORAL EVERY 4 HOURS PRN
Status: DISCONTINUED | OUTPATIENT
Start: 2024-09-10 | End: 2024-09-12 | Stop reason: HOSPADM

## 2024-09-10 RX ORDER — HYDROCODONE BITARTRATE AND ACETAMINOPHEN 5; 325 MG/1; MG/1
1 TABLET ORAL EVERY 4 HOURS PRN
Status: DISCONTINUED | OUTPATIENT
Start: 2024-09-10 | End: 2024-09-12 | Stop reason: HOSPADM

## 2024-09-10 RX ORDER — OXYTOCIN-SODIUM CHLORIDE 0.9% IV SOLN 30 UNIT/500ML 30-0.9/5 UT/ML-%
95 SOLUTION INTRAVENOUS CONTINUOUS PRN
Status: DISCONTINUED | OUTPATIENT
Start: 2024-09-10 | End: 2024-09-12 | Stop reason: HOSPADM

## 2024-09-10 RX ORDER — DIPHENHYDRAMINE HYDROCHLORIDE 50 MG/ML
12.5 INJECTION INTRAMUSCULAR; INTRAVENOUS
Status: DISCONTINUED | OUTPATIENT
Start: 2024-09-10 | End: 2024-09-12 | Stop reason: HOSPADM

## 2024-09-10 RX ORDER — HYDROCORTISONE 25 MG/G
CREAM TOPICAL 3 TIMES DAILY PRN
Status: DISCONTINUED | OUTPATIENT
Start: 2024-09-10 | End: 2024-09-12 | Stop reason: HOSPADM

## 2024-09-10 RX ORDER — MISOPROSTOL 200 UG/1
800 TABLET ORAL
Status: DISCONTINUED | OUTPATIENT
Start: 2024-09-10 | End: 2024-09-10

## 2024-09-10 RX ORDER — BUPIVACAINE HYDROCHLORIDE 7.5 MG/ML
INJECTION, SOLUTION EPIDURAL; RETROBULBAR
Status: DISCONTINUED | OUTPATIENT
Start: 2024-09-10 | End: 2024-09-10

## 2024-09-10 RX ORDER — METHYLERGONOVINE MALEATE 0.2 MG/ML
200 INJECTION INTRAVENOUS
Status: DISCONTINUED | OUTPATIENT
Start: 2024-09-10 | End: 2024-09-10

## 2024-09-10 RX ORDER — DEXMEDETOMIDINE HYDROCHLORIDE 100 UG/ML
INJECTION, SOLUTION INTRAVENOUS
Status: DISCONTINUED | OUTPATIENT
Start: 2024-09-10 | End: 2024-09-10

## 2024-09-10 RX ORDER — SODIUM CHLORIDE 0.9 % (FLUSH) 0.9 %
2 SYRINGE (ML) INJECTION EVERY 6 HOURS
Status: DISCONTINUED | OUTPATIENT
Start: 2024-09-11 | End: 2024-09-12

## 2024-09-10 RX ORDER — MORPHINE SULFATE 1 MG/ML
INJECTION, SOLUTION EPIDURAL; INTRATHECAL; INTRAVENOUS
Status: DISCONTINUED | OUTPATIENT
Start: 2024-09-10 | End: 2024-09-10

## 2024-09-10 RX ORDER — SODIUM CHLORIDE, SODIUM LACTATE, POTASSIUM CHLORIDE, CALCIUM CHLORIDE 600; 310; 30; 20 MG/100ML; MG/100ML; MG/100ML; MG/100ML
INJECTION, SOLUTION INTRAVENOUS CONTINUOUS PRN
Status: DISCONTINUED | OUTPATIENT
Start: 2024-09-10 | End: 2024-09-10

## 2024-09-10 RX ORDER — PROCHLORPERAZINE EDISYLATE 5 MG/ML
5 INJECTION INTRAMUSCULAR; INTRAVENOUS EVERY 6 HOURS PRN
Status: DISCONTINUED | OUTPATIENT
Start: 2024-09-10 | End: 2024-09-12 | Stop reason: HOSPADM

## 2024-09-10 RX ORDER — PHENYLEPHRINE HYDROCHLORIDE 10 MG/ML
INJECTION INTRAVENOUS
Status: DISCONTINUED | OUTPATIENT
Start: 2024-09-10 | End: 2024-09-10

## 2024-09-10 RX ORDER — NALOXONE HCL 0.4 MG/ML
0.04 VIAL (ML) INJECTION EVERY 5 MIN PRN
Status: DISCONTINUED | OUTPATIENT
Start: 2024-09-10 | End: 2024-09-12 | Stop reason: HOSPADM

## 2024-09-10 RX ORDER — KETOROLAC TROMETHAMINE 30 MG/ML
30 INJECTION, SOLUTION INTRAMUSCULAR; INTRAVENOUS
Status: DISPENSED | OUTPATIENT
Start: 2024-09-10 | End: 2024-09-12

## 2024-09-10 RX ORDER — LANOLIN ALCOHOL/MO/W.PET/CERES
1 CREAM (GRAM) TOPICAL DAILY
Status: DISCONTINUED | OUTPATIENT
Start: 2024-09-11 | End: 2024-09-12 | Stop reason: HOSPADM

## 2024-09-10 RX ORDER — SIMETHICONE 80 MG
1 TABLET,CHEWABLE ORAL EVERY 6 HOURS PRN
Status: DISCONTINUED | OUTPATIENT
Start: 2024-09-10 | End: 2024-09-12 | Stop reason: HOSPADM

## 2024-09-10 RX ORDER — IBUPROFEN 400 MG/1
800 TABLET ORAL EVERY 8 HOURS
Status: DISCONTINUED | OUTPATIENT
Start: 2024-09-12 | End: 2024-09-12 | Stop reason: HOSPADM

## 2024-09-10 RX ORDER — HYDROMORPHONE HYDROCHLORIDE 2 MG/ML
1 INJECTION, SOLUTION INTRAMUSCULAR; INTRAVENOUS; SUBCUTANEOUS EVERY 4 HOURS PRN
Status: DISCONTINUED | OUTPATIENT
Start: 2024-09-10 | End: 2024-09-12 | Stop reason: HOSPADM

## 2024-09-10 RX ORDER — ONDANSETRON HYDROCHLORIDE 2 MG/ML
4 INJECTION, SOLUTION INTRAVENOUS EVERY 6 HOURS PRN
Status: DISCONTINUED | OUTPATIENT
Start: 2024-09-10 | End: 2024-09-12 | Stop reason: HOSPADM

## 2024-09-10 RX ORDER — DEXAMETHASONE SODIUM PHOSPHATE 4 MG/ML
INJECTION, SOLUTION INTRA-ARTICULAR; INTRALESIONAL; INTRAMUSCULAR; INTRAVENOUS; SOFT TISSUE
Status: DISCONTINUED | OUTPATIENT
Start: 2024-09-10 | End: 2024-09-10

## 2024-09-10 RX ORDER — ONDANSETRON 8 MG/1
8 TABLET, ORALLY DISINTEGRATING ORAL EVERY 8 HOURS PRN
Status: DISCONTINUED | OUTPATIENT
Start: 2024-09-10 | End: 2024-09-12 | Stop reason: HOSPADM

## 2024-09-10 RX ORDER — CARBOPROST TROMETHAMINE 250 UG/ML
250 INJECTION, SOLUTION INTRAMUSCULAR
Status: DISCONTINUED | OUTPATIENT
Start: 2024-09-10 | End: 2024-09-10

## 2024-09-10 RX ORDER — FAMOTIDINE 10 MG/ML
20 INJECTION INTRAVENOUS
Status: DISCONTINUED | OUTPATIENT
Start: 2024-09-10 | End: 2024-09-10

## 2024-09-10 RX ORDER — OXYTOCIN-SODIUM CHLORIDE 0.9% IV SOLN 30 UNIT/500ML 30-0.9/5 UT/ML-%
10 SOLUTION INTRAVENOUS ONCE AS NEEDED
Status: DISCONTINUED | OUTPATIENT
Start: 2024-09-10 | End: 2024-09-12 | Stop reason: HOSPADM

## 2024-09-10 RX ORDER — OXYTOCIN-SODIUM CHLORIDE 0.9% IV SOLN 30 UNIT/500ML 30-0.9/5 UT/ML-%
20 SOLUTION INTRAVENOUS ONCE AS NEEDED
Status: DISCONTINUED | OUTPATIENT
Start: 2024-09-10 | End: 2024-09-12 | Stop reason: HOSPADM

## 2024-09-10 RX ORDER — TRANEXAMIC ACID 10 MG/ML
1000 INJECTION, SOLUTION INTRAVENOUS EVERY 30 MIN PRN
Status: DISCONTINUED | OUTPATIENT
Start: 2024-09-10 | End: 2024-09-12 | Stop reason: HOSPADM

## 2024-09-10 RX ORDER — HYDROCODONE BITARTRATE AND ACETAMINOPHEN 7.5; 325 MG/1; MG/1
1 TABLET ORAL EVERY 4 HOURS PRN
Status: DISCONTINUED | OUTPATIENT
Start: 2024-09-10 | End: 2024-09-12 | Stop reason: HOSPADM

## 2024-09-10 RX ORDER — OXYTOCIN-SODIUM CHLORIDE 0.9% IV SOLN 30 UNIT/500ML 30-0.9/5 UT/ML-%
95 SOLUTION INTRAVENOUS ONCE AS NEEDED
Status: COMPLETED | OUTPATIENT
Start: 2024-09-10 | End: 2024-09-10

## 2024-09-10 RX ORDER — AMOXICILLIN 250 MG
1 CAPSULE ORAL NIGHTLY
Status: DISCONTINUED | OUTPATIENT
Start: 2024-09-10 | End: 2024-09-12 | Stop reason: HOSPADM

## 2024-09-10 RX ORDER — PRENATAL WITH FERROUS FUM AND FOLIC ACID 3080; 920; 120; 400; 22; 1.84; 3; 20; 10; 1; 12; 200; 27; 25; 2 [IU]/1; [IU]/1; MG/1; [IU]/1; MG/1; MG/1; MG/1; MG/1; MG/1; MG/1; UG/1; MG/1; MG/1; MG/1; MG/1
1 TABLET ORAL DAILY
Status: DISCONTINUED | OUTPATIENT
Start: 2024-09-11 | End: 2024-09-12 | Stop reason: HOSPADM

## 2024-09-10 RX ORDER — SODIUM CHLORIDE, SODIUM LACTATE, POTASSIUM CHLORIDE, CALCIUM CHLORIDE 600; 310; 30; 20 MG/100ML; MG/100ML; MG/100ML; MG/100ML
INJECTION, SOLUTION INTRAVENOUS CONTINUOUS
Status: DISCONTINUED | OUTPATIENT
Start: 2024-09-10 | End: 2024-09-10

## 2024-09-10 RX ADMIN — TRANEXAMIC ACID 1000 MG: 10 INJECTION, SOLUTION INTRAVENOUS at 09:09

## 2024-09-10 RX ADMIN — BUPIVACAINE HYDROCHLORIDE 1.8 ML: 7.5 INJECTION, SOLUTION EPIDURAL; RETROBULBAR at 05:09

## 2024-09-10 RX ADMIN — PHENYLEPHRINE HYDROCHLORIDE 100 MCG: 10 INJECTION INTRAVENOUS at 05:09

## 2024-09-10 RX ADMIN — PHENYLEPHRINE HYDROCHLORIDE 200 MCG: 10 INJECTION INTRAVENOUS at 05:09

## 2024-09-10 RX ADMIN — KETOROLAC TROMETHAMINE 30 MG: 30 INJECTION, SOLUTION INTRAMUSCULAR at 11:09

## 2024-09-10 RX ADMIN — Medication 95 MILLI-UNITS/MIN: at 09:09

## 2024-09-10 RX ADMIN — OXYTOCIN 10 UNITS: 10 INJECTION, SOLUTION INTRAMUSCULAR; INTRAVENOUS at 09:09

## 2024-09-10 RX ADMIN — ONDANSETRON 8 MG: 8 TABLET, ORALLY DISINTEGRATING ORAL at 11:09

## 2024-09-10 RX ADMIN — DEXMEDETOMIDINE 5 MCG: 200 INJECTION, SOLUTION INTRAVENOUS at 05:09

## 2024-09-10 RX ADMIN — Medication 334 ML/HR: at 05:09

## 2024-09-10 RX ADMIN — ONDANSETRON 8 MG: 2 INJECTION INTRAMUSCULAR; INTRAVENOUS at 05:09

## 2024-09-10 RX ADMIN — CEFAZOLIN 2 G: 330 INJECTION, POWDER, FOR SOLUTION INTRAMUSCULAR; INTRAVENOUS at 05:09

## 2024-09-10 RX ADMIN — SODIUM CHLORIDE, SODIUM LACTATE, POTASSIUM CHLORIDE, AND CALCIUM CHLORIDE: 600; 310; 30; 20 INJECTION, SOLUTION INTRAVENOUS at 05:09

## 2024-09-10 RX ADMIN — DEXAMETHASONE SODIUM PHOSPHATE 4 MG: 4 INJECTION, SOLUTION INTRA-ARTICULAR; INTRALESIONAL; INTRAMUSCULAR; INTRAVENOUS; SOFT TISSUE at 05:09

## 2024-09-10 RX ADMIN — MISOPROSTOL 800 MCG: 200 TABLET ORAL at 09:09

## 2024-09-10 RX ADMIN — Medication 2 ML: at 09:09

## 2024-09-10 RX ADMIN — MORPHINE SULFATE 0.1 MG: 1 INJECTION, SOLUTION EPIDURAL; INTRATHECAL; INTRAVENOUS at 05:09

## 2024-09-10 RX ADMIN — METHYLERGONOVINE MALEATE 200 MCG: 0.2 INJECTION, SOLUTION INTRAMUSCULAR; INTRAVENOUS at 09:09

## 2024-09-10 NOTE — H&P
[facility] - Labor & Delivery  Obstetrics  History & Physical    Patient Name: Rosemary Montes De Oca  MRN: [id number]  Admission Date: 9/10/2024  Primary Care Provider: Genoveva, Primary Doctor    Subjective:     Principal Problem:Cholestasis during pregnancy in third trimester    History of Present Illness:  Presents for assessment for possible  v's repeat  due to newly diagnosed Cholestasis    Obstetric HPI:  Patient reports None contractions, active fetal movement, No vaginal bleeding , No loss of fluid     This pregnancy has been complicated by cholestasis, prior  for breech, anemia    OB History    Para Term  AB Living   2 1 1 0 0 1   SAB IAB Ectopic Multiple Live Births   0 0 0 0 1      # Outcome Date GA Lbr William/2nd Weight Sex Type Anes PTL Lv   2 Current            1 Term 22    F CS-Unspec         Complications: Cholestasis, Breech presentation     History reviewed. No pertinent past medical history.  Past Surgical History:   Procedure Laterality Date     SECTION         PTA Medications   Medication Sig    ferrous sulfate (IRON, FERROUS SULFATE,) 325 mg (65 mg iron) Tab tablet Take 1 tablet (325 mg total) by mouth once daily.    PNV no.153/FA/om3/dha/epa/fish (PRENATAL GUMMIES ORAL) Take by mouth.       Review of patient's allergies indicates:  No Known Allergies     Family History       Problem Relation (Age of Onset)    Cancer Maternal Grandmother    Diabetes Paternal Grandmother    Heart failure Paternal Grandmother          Tobacco Use    Smoking status: Never    Smokeless tobacco: Never   Substance and Sexual Activity    Alcohol use: Not Currently    Drug use: Never    Sexual activity: Yes     Partners: Male     Birth control/protection: None     Review of Systems   Constitutional:         Voices no complaints and appears in NAD   All other systems reviewed and are negative.     Objective:     Vital Signs (Most Recent):    Vital Signs (24h Range):           There is no  height or weight on file to calculate BMI.    FHT: Cat 1 (reassuring)  TOCO:  irritability     Physical Exam:   Constitutional: She is oriented to person, place, and time. She appears well-developed and well-nourished.        Pulmonary/Chest: Effort normal.        Abdominal: Soft.     Genitourinary:    Vagina normal.             Musculoskeletal: Normal range of motion and moves all extremeties.       Neurological: She is alert and oriented to person, place, and time.    Skin: Skin is warm and dry.    Psychiatric: She has a normal mood and affect. Her behavior is normal. Judgment and thought content normal.        Cervix:  FT/50/V/-3     Significant Labs:  Lab Results   Component Value Date    GROUPTRH O POS 2024    HEPBSAG Non-reactive 2024       I have personallly reviewed all pertinent lab results from the last 24 hours.  Assessment/Plan:     28 y.o. female  at 38w6d for:    * Cholestasis during pregnancy in third trimester  Admit for repeat     Anemia affecting pregnancy in third trimester  Iron supplement as indicated    Previous  delivery affecting pregnancy  Admit for repeat        Uyen Finn CNM  Obstetrics  [facility] - Labor & Delivery

## 2024-09-10 NOTE — ANESTHESIA PREPROCEDURE EVALUATION
09/10/2024  Rosemary Montes De Oca is a 28 y.o., female.  OB History    Para Term  AB Living   2 1 1 0 0 1   SAB IAB Ectopic Multiple Live Births      0 0 0 0 1            Cholestasis affecting pregnancy       Pre-op Assessment    I have reviewed the Patient Summary Reports.     I have reviewed the Nursing Notes.    I have reviewed the Medications.     Review of Systems  Anesthesia Hx:  No previous Anesthesia                Social:  Non-Smoker       Hematology/Oncology:  Hematology Normal   Oncology Normal                                   EENT/Dental:  EENT/Dental Normal           Cardiovascular:  Exercise tolerance: good                NYHA Classification I                           Pulmonary:  Pulmonary Normal                       Renal/:  Renal/ Normal                 Hepatic/GI:  Hepatic/GI Normal                 Musculoskeletal:  Musculoskeletal Normal                Neurological:  Neurology Normal                                      Endocrine:  Endocrine Normal            Dermatological:  Skin Normal    Psych:  Psychiatric Normal                    Physical Exam  General: Well nourished and Cooperative    Airway:  Mallampati: II   Mouth Opening: Normal  Tongue: Normal  Neck ROM: Normal ROM    Chest/Lungs:  Clear to auscultation, Normal Respiratory Rate    Heart:  Rate: Normal  Rhythm: Regular Rhythm        Anesthesia Plan  Type of Anesthesia, risks & benefits discussed:    Anesthesia Type: Spinal, CSE, Gen ETT  Intra-op Monitoring Plan: Standard ASA Monitors  Post Op Pain Control Plan: multimodal analgesia, epidural analgesia and intrathecal opioid  Informed Consent: Informed consent signed with the Patient and all parties understand the risks and agree with anesthesia plan.  All questions answered. Patient consented to blood products? Yes  ASA Score: 2  Day of Surgery Review of History &  Physical: I have interviewed and examined the patient. I have reviewed the patient's H&P dated: There are no significant changes.     Ready For Surgery From Anesthesia Perspective.     .

## 2024-09-10 NOTE — ASSESSMENT & PLAN NOTE
Admit for repeat  09/10/2024  Reviewed risks of c/section, including but not limited to: infection, bleedingdamage to bowel, bladder, dvt, htn, cva; damage to fetus.  All questions answered  Consent signed and witnessed; OR/anesthesia aware   Ancef preop

## 2024-09-10 NOTE — ANESTHESIA PROCEDURE NOTES
Spinal    Diagnosis: iup, repeat csection  Patient location during procedure: OR  Start time: 9/10/2024 5:14 PM  Timeout: 9/10/2024 5:14 PM  End time: 9/10/2024 5:18 PM    Staffing  Authorizing Provider: Max Arevalo MD  Performing Provider: Michael Parker Jr., CRNA    Staffing  Performed by: Michael Parker Jr., CRNA  Authorized by: Max Arevalo MD    Preanesthetic Checklist  Completed: patient identified, IV checked, site marked, risks and benefits discussed, surgical consent, monitors and equipment checked, pre-op evaluation and timeout performed  Spinal Block  Patient position: sitting  Prep: ChloraPrep  Patient monitoring: heart rate, cardiac monitor and continuous pulse ox  Approach: midline  Location: L3-4  Injection technique: single shot  CSF Fluid: clear free-flowing CSF  Needle  Needle type: pencil-tip   Needle gauge: 25 G  Needle length: 3.5 in  Additional Documentation: no paresthesia on injection and negative aspiration for heme  Needle localization: anatomical landmarks  Assessment  Sensory level: T5   Dermatomal levels determined by alcohol wipe and pinch or prick  Ease of block: easy  Patient's tolerance of the procedure: comfortable throughout block and no complaints

## 2024-09-10 NOTE — TRANSFER OF CARE
Anesthesia Transfer of Care Note    Patient: Rosemary Montes De Oca    Procedure(s) Performed: Procedure(s) (LRB):   SECTION (N/A)    Patient location: Labor and Delivery    Anesthesia Type: spinal    Transport from OR: Transported from OR on room air with adequate spontaneous ventilation    Post pain: adequate analgesia    Post assessment: no apparent anesthetic complications    Post vital signs: stable    Level of consciousness: awake    Nausea/Vomiting: no nausea/vomiting    Complications: none    Transfer of care protocol was followed      Last vitals: Visit Vitals  Wt 81.9 kg (180 lb 8.9 oz)   LMP 12/15/2023   Breastfeeding No   BMI 29.14 kg/m²

## 2024-09-10 NOTE — H&P
[facility] - Labor & Delivery  Obstetrics  History & Physical    Patient Name: Rosemary Montes De Oca  MRN: [id number]  Admission Date: 9/10/2024  Primary Care Provider: Genoveva, Primary Doctor    Subjective:     Principal Problem:Cholestasis during pregnancy in third trimester    History of Present Illness:  27 y/o  at 38w6d; presents for assessment for possible  v's repeat  due to newly diagnosed Cholestasis    No new subjective & objective note has been filed under this hospital service since the last note was generated.    Assessment/Plan:     28 y.o. female  at 38w6d for:    * Cholestasis during pregnancy in third trimester  Admit for repeat     Anemia affecting pregnancy in third trimester  Iron supplement as indicated  09/10/2024  Hgb 12.5 preop    Previous  delivery affecting pregnancy  Admit for repeat  09/10/2024  Reviewed risks of c/section, including but not limited to: infection, bleedingdamage to bowel, bladder, dvt, htn, cva; damage to fetus.  All questions answered  Consent signed and witnessed; OR/anesthesia aware   Ancef preop        Malena Sandra MD  Obstetrics  [facility] - [facility] Delivery

## 2024-09-10 NOTE — TELEPHONE ENCOUNTER
Called patient to discuss elevated bile acids.  Advised delivery indicated.  States will come in around 4 pm.  Advised that if Cooks could not be placed  would be indicated.  Advised NPO.

## 2024-09-10 NOTE — HPI
27 y/o  at 38w6d; presents for assessment for possible  v's repeat  due to newly diagnosed Cholestasis

## 2024-09-10 NOTE — SUBJECTIVE & OBJECTIVE
Obstetric HPI:  Patient reports None contractions, active fetal movement, No vaginal bleeding , No loss of fluid     This pregnancy has been complicated by cholestasis, prior  for breech, anemia    OB History    Para Term  AB Living   2 1 1 0 0 1   SAB IAB Ectopic Multiple Live Births   0 0 0 0 1      # Outcome Date GA Lbr William/2nd Weight Sex Type Anes PTL Lv   2 Current            1 Term 22    F CS-Unspec         Complications: Cholestasis, Breech presentation     History reviewed. No pertinent past medical history.  Past Surgical History:   Procedure Laterality Date     SECTION         PTA Medications   Medication Sig    ferrous sulfate (IRON, FERROUS SULFATE,) 325 mg (65 mg iron) Tab tablet Take 1 tablet (325 mg total) by mouth once daily.    PNV no.153/FA/om3/dha/epa/fish (PRENATAL GUMMIES ORAL) Take by mouth.       Review of patient's allergies indicates:  No Known Allergies     Family History       Problem Relation (Age of Onset)    Cancer Maternal Grandmother    Diabetes Paternal Grandmother    Heart failure Paternal Grandmother          Tobacco Use    Smoking status: Never    Smokeless tobacco: Never   Substance and Sexual Activity    Alcohol use: Not Currently    Drug use: Never    Sexual activity: Yes     Partners: Male     Birth control/protection: None     Review of Systems   Constitutional:         Voices no complaints and appears in NAD   All other systems reviewed and are negative.     Objective:     Vital Signs (Most Recent):    Vital Signs (24h Range):           There is no height or weight on file to calculate BMI.    FHT: Cat 1 (reassuring)  TOCO:  irritability     Physical Exam:   Constitutional: She is oriented to person, place, and time. She appears well-developed and well-nourished.        Pulmonary/Chest: Effort normal.        Abdominal: Soft.     Genitourinary:    Vagina normal.             Musculoskeletal: Normal range of motion and moves all extremeties.        Neurological: She is alert and oriented to person, place, and time.    Skin: Skin is warm and dry.    Psychiatric: She has a normal mood and affect. Her behavior is normal. Judgment and thought content normal.        Cervix:  FT/50/V/-3     Significant Labs:  Lab Results   Component Value Date    GROUPTRH O POS 01/29/2024    HEPBSAG Non-reactive 01/29/2024       I have personallly reviewed all pertinent lab results from the last 24 hours.

## 2024-09-10 NOTE — L&D DELIVERY NOTE
[facility] - Labor & Delivery   Section   Operative Note    SUMMARY     Date of Procedure: 9/10/2024     Procedure: Procedure(s) (LRB):   SECTION (N/A)    Surgeons and Role:     * Malena Sandra MD - Primary    Assistant:  mayelin adhikari    Pre-Operative Diagnosis: Repeat Section    Post-Operative Diagnosis: Post-Op Diagnosis Codes:     * Pregnant [Z34.90]    Anesthesia: Spinal/Epidural    Technical Procedures Used: repeat low transverse  section           Description of the Findings of the Procedure: vertex, clear fluid; normal adnexa    Significant Surgical Tasks Conducted by the Assistant(s), if Applicable: 1st assist    Complications: No    Blood Loss: * No values recorded between 9/10/2024 12:00 AM and 9/10/2024  6:18 PM *450     With patient in supine position, the legs are  and Mckeon Catheter placed and positioning to supine done.   Abdomen prepped with Chloroprep and 3 minute drying time allowed prior to draping of the abdomen.   Time out taken with OR team members.  Pfannenstiel Incision made through the skin, transverse fascial incision developed, rectus muscles  in the midline and the peritoneum entered.   no adhesions noted.  The lower uterine segment and position of the fetus identified.     Low Transverse Incision made through well developed lower uterine segment and extended laterally with blunt dissection.   Clear fluid noted.  Infant delivered from vertex presentation.  Cord clamped after one minute and  handed to attending nurse.  Cord blood taken, placenta delivered.  The uterus was exteriorized.  The edges of the uterine incision are grasped with Kincaid clamps at the angles and the inferior and superior midline edges of the incision.    Closure with running lock 1 monocryl, starting at each angle, tying in the midline.   Observation for bleeding with suture of any bleeding along the hysterotomy line.   With good hemostasis noted, the anterior pelvis  "is rinsed with sterile saline.   Right and left adnexa with normal anatomy.       The uterus was then returned to the abdomen, gutters cleared of all clots and debris.  The hysterotomy incisions were examined and noted to be hemostatic.     Closure of the abdomen with 2 0 Vicryl running of the peritoneum,     The fascia was closed with 0 vicryl starting at each angle and tying the knot in the midline after locking the first.  Subcutaneous tissue was copiously irrigated and made hemostatic with cautery.  Subcutaneous space closed with 2-0 plain.       Skin closure with 4 0 monocryl subcuticular.  Wound dressed with aquasel and pressure dressing applied.          Specimens:   Specimen (24h ago, onward)      placenta            Condition: Good    VTE Risk Mitigation (From admission, onward)           Ordered     Place sequential compression device  Until discontinued         09/10/24 1613     IP VTE LOW RISK PATIENT  Once         09/10/24 1613                    Disposition: PACU - hemodynamically stable.    Attestation: Good         Delivery Information for Chelsea Montes De Oca    Birth information:  YOB: 2022   Time of birth: 5:31 PM   Sex: female   Head Delivery Date/Time: 9/10/2024  5:31 PM   Delivery type: , Low Transverse   Gestational Age: 38w6d        Delivery Providers    Delivering clinician: Malena Sandra MD   Provider Role    Sushila Buchanan RN Registered Nurse    Yoana Rick RN Registered Nurse    Neema Leblanc, [facility] Assist    Diana Gaston, [facility]    Michael Parker Jr., CRNA Nurse Anesthetist              Measurements    Weight: 3330 g  Weight (lbs): 7 lb 5.5 oz  Length: 50.8 cm  Length (in): 20"  Head circumference: 34.9 cm  Chest circumference: 34.3 cm  Abdominal girth: 32.4 cm         Apgars    Living status: Living  Apgar Component Scores:  1 min.:  5 min.:  10 min.:  15 min.:  20 min.:    Skin color:  0  1       Heart rate:  2  2       Reflex " irritability:  2  2       Muscle tone:  2  2       Respiratory effort:  2  2       Total:  8  9       Apgars assigned by: MAXI RESTREPO RN         Operative Delivery    Forceps attempted?: No  Vacuum extractor attempted?: No         Shoulder Dystocia    Shoulder dystocia present?: No           Presentation    Presentation: Vertex           Interventions/Resuscitation    Method: Bulb Suctioning, Tactile Stimulation, Deep Suctioning       Cord    Vessels: 3 vessels  Complications: None  Delayed Cord Clamping?: Yes  Cord Clamped Date/Time: 9/10/2024  5:32 PM  Cord Blood Disposition: Lab  Gases Sent?: No  Stem Cell Collection (by MD): No       Placenta    Placenta delivery date/time: 9/10/2024 1733  Placenta removal: Manual removal  Placenta appearance: Intact  Placenta disposition: Pathology           Labor Events:       labor: No     Labor Onset Date/Time:         Dilation Complete Date/Time:         Start Pushing Date/Time:         Start Pushing Date/Time:       Rupture Date/Time: 09/10/24  1731         Rupture type: ARM (Artificial Rupture)         Fluid Amount:       Fluid Color: Clear               steroids: None     Antibiotics given for GBS: No     Induction:       Indications for induction:        Augmentation:       Indications for augmentation:       Labor complications: None     Additional complications:          Cervical ripening:                     Delivery:      Episiotomy:       Indication for Episiotomy:       Perineal Lacerations:   Repaired:      Periurethral Laceration:   Repaired:     Labial Laceration:   Repaired:     Sulcus Laceration:   Repaired:     Vaginal Laceration:   Repaired:     Cervical Laceration:   Repaired:     Repair suture:       Repair # of packets:       Last Value - EBL - Nursing (mL):       Sum - EBL - Nursing (mL): 0     Last Value - EBL - Anesthesia (mL):      Calculated QBL (mL):       Running total QBL (mL):       Vaginal Sweep Performed:       Surgicount  Correct:       Vaginal Packing:   Quantity:       Other providers:       Anesthesia    Method: Spinal          Details (if applicable):  Trial of Labor No    Categorization: Repeat    Priority: Routine   Indications for : Repeat Section   Incision Type: low transverse     Additional  information:  Forceps:    Vacuum:    Breech:    Observed anomalies    Other (Comments):

## 2024-09-11 LAB — TREPONEMA PALLIDUM IGG+IGM AB [PRESENCE] IN SERUM OR PLASMA BY IMMUNOASSAY: NONREACTIVE

## 2024-09-11 PROCEDURE — 63600175 PHARM REV CODE 636 W HCPCS: Performed by: OBSTETRICS & GYNECOLOGY

## 2024-09-11 PROCEDURE — 11000001 HC ACUTE MED/SURG PRIVATE ROOM

## 2024-09-11 PROCEDURE — A4216 STERILE WATER/SALINE, 10 ML: HCPCS | Performed by: OBSTETRICS & GYNECOLOGY

## 2024-09-11 PROCEDURE — 25000003 PHARM REV CODE 250: Performed by: OBSTETRICS & GYNECOLOGY

## 2024-09-11 RX ADMIN — KETOROLAC TROMETHAMINE 30 MG: 30 INJECTION, SOLUTION INTRAMUSCULAR at 12:09

## 2024-09-11 RX ADMIN — Medication 2 ML: at 05:09

## 2024-09-11 RX ADMIN — Medication 2 ML: at 12:09

## 2024-09-11 RX ADMIN — PRENATAL VITAMINS-IRON FUMARATE 27 MG IRON-FOLIC ACID 0.8 MG TABLET 1 TABLET: at 10:09

## 2024-09-11 RX ADMIN — Medication 2 ML: at 11:09

## 2024-09-11 RX ADMIN — FERROUS SULFATE TAB 325 MG (65 MG ELEMENTAL FE) 1 EACH: 325 (65 FE) TAB at 10:09

## 2024-09-11 RX ADMIN — KETOROLAC TROMETHAMINE 30 MG: 30 INJECTION, SOLUTION INTRAMUSCULAR at 05:09

## 2024-09-11 RX ADMIN — DOCUSATE SODIUM 100 MG: 100 CAPSULE, LIQUID FILLED ORAL at 10:09

## 2024-09-11 RX ADMIN — KETOROLAC TROMETHAMINE 30 MG: 30 INJECTION, SOLUTION INTRAMUSCULAR at 11:09

## 2024-09-11 RX ADMIN — Medication 2 ML: at 06:09

## 2024-09-11 NOTE — PLAN OF CARE
Patient stable. Fundus is firm, bleedings is appropriate post delivery, and blood pressures are also stable. Ensuring mother is appropriately bonding with baby, responding to baby's cues.       Problem: Adult Inpatient Plan of Care  Goal: Plan of Care Review  Outcome: Progressing  Goal: Patient-Specific Goal (Individualized)  Outcome: Progressing  Goal: Absence of Hospital-Acquired Illness or Injury  Outcome: Progressing  Goal: Optimal Comfort and Wellbeing  Outcome: Progressing  Goal: Readiness for Transition of Care  Outcome: Progressing     Problem:  Fall Injury Risk  Goal: Absence of Fall, Infant Drop and Related Injury  Outcome: Progressing     Problem: Infection  Goal: Absence of Infection Signs and Symptoms  Outcome: Progressing     Problem: Wound  Goal: Optimal Coping  Outcome: Progressing  Goal: Optimal Functional Ability  Outcome: Progressing  Goal: Absence of Infection Signs and Symptoms  Outcome: Progressing  Goal: Improved Oral Intake  Outcome: Progressing  Goal: Optimal Pain Control and Function  Outcome: Progressing  Goal: Skin Health and Integrity  Outcome: Progressing  Goal: Optimal Wound Healing  Outcome: Progressing     Problem: Postpartum ( Delivery)  Goal: Successful Parent Role Transition  Outcome: Progressing  Goal: Hemostasis  Outcome: Progressing  Goal: Effective Bowel Elimination  Outcome: Progressing  Goal: Fluid and Electrolyte Balance  Outcome: Progressing  Goal: Absence of Infection Signs and Symptoms  Outcome: Progressing  Goal: Anesthesia/Sedation Recovery  Outcome: Progressing  Goal: Optimal Pain Control and Function  Outcome: Progressing  Goal: Nausea and Vomiting Relief  Outcome: Progressing  Goal: Effective Urinary Elimination  Outcome: Progressing  Goal: Effective Oxygenation and Ventilation  Outcome: Progressing     Problem: Breastfeeding  Goal: Effective Breastfeeding  Outcome: Progressing     Problem: Skin Injury Risk Increased  Goal: Skin Health and  Integrity  Outcome: Progressing

## 2024-09-11 NOTE — LACTATION NOTE
This note was copied from a baby's chart.  Upon entering room, mother sitting up in bed and infant sleeping on her chest, skin to skin. Mother reports that infant had successful first feeding. Mother says that she breast fed her first child for over two years and has plans to breast feed this infant for at least 1.5 years.     Lactation packet reviewed for days 1-2.  Discussed early feeding cues and encouraged mother to feed baby in response to those cues. Encouraged on demand feedings and skin to skin. Reviewed normal feeding expectations of 8 or more feedings per 24 hour period, cues that babies use to signal hunger and satiety and cluster feeding. Discussed the adequacy of colostrum and baby belly size for the first 3 days of life along with expected output.     Discussed risks of introducing a pacifier or artificial nipple and discussed the AAP recommendation to avoid the use of pacifiers until 1 month of age for breastfeeding infants. 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      Mother states understanding and verbalized appropriate recall. Encouraged mother to call for assistance when desired or when infant is showing signs of hunger, contact number provided, mother verbalizes understanding.

## 2024-09-11 NOTE — PROGRESS NOTES
[facility] - Labor & Delivery  Obstetrics  Postpartum Progress Note    Patient Name: Rosemary Montes De Oca  MRN: [id number]  Admission Date: 9/10/2024  Hospital Length of Stay: 1 days  Attending Physician: Malena Sandra MD  Primary Care Provider: Genoveva, Primary Doctor    Subjective:     Principal Problem:Status post repeat low transverse  section    Hospital Course:  Admit for repeat , Dr Sandra aware  Patient has been NPO  Cervix FT/50/V/-3, unable to place balloon   09/10/2024 --repeat c/section recommended      Interval History:     She is doing well this morning. She is tolerating a regular diet without nausea or vomiting. She is voiding spontaneously. She is ambulating. She has passed flatus, and has not a BM. Vaginal bleeding is moderate. She denies fever or chills. Abdominal pain is moderate and controlled with oral medications. She Is breastfeeding. Denies dizziness, SOB, palpitations.    Objective:     Vital Signs (Most Recent):  Temp: 98.7 °F (37.1 °C) (24)  Pulse: 84 (24)  Resp: 14 (24)  BP: 118/81 (24 0730)  SpO2: 97 % (09/10/24 2240) Vital Signs (24h Range):  Temp:  [96 °F (35.6 °C)-98.7 °F (37.1 °C)] 98.7 °F (37.1 °C)  Pulse:  [42-86] 84  Resp:  [14-16] 14  SpO2:  [95 %-100 %] 97 %  BP: (103-132)/(69-98) 118/81     Weight: 81.9 kg (180 lb 8.9 oz)  Body mass index is 29.14 kg/m².      Intake/Output Summary (Last 24 hours) at 2024 0923  Last data filed at 2024 0600  Gross per 24 hour   Intake 1100 ml   Output 2115 ml   Net -1015 ml         Significant Labs:  Lab Results   Component Value Date    GROUPTRH O POS 09/10/2024    HEPBSAG Non-reactive 2024     Recent Labs   Lab 09/10/24  1622   HGB 12.5   HCT 37.6       I have personallly reviewed all pertinent lab results from the last 24 hours.    Physical Exam:   Constitutional: She is oriented to person, place, and time. She appears well-developed and well-nourished. No distress.        Cardiovascular:  Normal rate, regular rhythm and normal heart sounds.             Pulmonary/Chest: Effort normal and breath sounds normal.        Abdominal: Soft. Bowel sounds are normal. She exhibits abdominal incision (dressing in place). She exhibits no distension and no mass. There is abdominal tenderness (appropriate). There is no rebound and no guarding. No hernia.     Genitourinary: There is bleeding (lochia) in the vagina. Uterus is not tender.           Musculoskeletal: Normal range of motion and moves all extremeties.       Neurological: She is alert and oriented to person, place, and time.    Skin: Skin is warm and dry.    Psychiatric: She has a normal mood and affect. Her behavior is normal. Thought content normal.       Review of Systems  Assessment/Plan:     28 y.o. female  for:    * Status post repeat low transverse  section  POD # 1 s/p RLTCS  Pt is doing well.  Proceed with routine post-operative care.  Pt counseled on management plan and discharge goals.    Cholestasis during pregnancy in third trimester  Admit for repeat     Anemia affecting pregnancy in third trimester  Iron supplement as indicated  09/10/2024  Hgb 12.5 preop        Disposition: As patient meets milestones, will plan to discharge 1-2 days.    Ej Mott MD  Obstetrics  [facility] - Labor & Delivery

## 2024-09-11 NOTE — SUBJECTIVE & OBJECTIVE
Interval History:     She is doing well this morning. She is tolerating a regular diet without nausea or vomiting. She is voiding spontaneously. She is ambulating. She has passed flatus, and has not a BM. Vaginal bleeding is moderate. She denies fever or chills. Abdominal pain is moderate and controlled with oral medications. She Is breastfeeding. Denies dizziness, SOB, palpitations.    Objective:     Vital Signs (Most Recent):  Temp: 98.7 °F (37.1 °C) (09/11/24 0715)  Pulse: 84 (09/11/24 0730)  Resp: 14 (09/11/24 0715)  BP: 118/81 (09/11/24 0730)  SpO2: 97 % (09/10/24 2240) Vital Signs (24h Range):  Temp:  [96 °F (35.6 °C)-98.7 °F (37.1 °C)] 98.7 °F (37.1 °C)  Pulse:  [42-86] 84  Resp:  [14-16] 14  SpO2:  [95 %-100 %] 97 %  BP: (103-132)/(69-98) 118/81     Weight: 81.9 kg (180 lb 8.9 oz)  Body mass index is 29.14 kg/m².      Intake/Output Summary (Last 24 hours) at 9/11/2024 0923  Last data filed at 9/11/2024 0600  Gross per 24 hour   Intake 1100 ml   Output 2115 ml   Net -1015 ml         Significant Labs:  Lab Results   Component Value Date    GROUPTRH O POS 09/10/2024    HEPBSAG Non-reactive 01/29/2024     Recent Labs   Lab 09/10/24  1622   HGB 12.5   HCT 37.6       I have personallly reviewed all pertinent lab results from the last 24 hours.    Physical Exam:   Constitutional: She is oriented to person, place, and time. She appears well-developed and well-nourished. No distress.       Cardiovascular:  Normal rate, regular rhythm and normal heart sounds.             Pulmonary/Chest: Effort normal and breath sounds normal.        Abdominal: Soft. Bowel sounds are normal. She exhibits abdominal incision (dressing in place). She exhibits no distension and no mass. There is abdominal tenderness (appropriate). There is no rebound and no guarding. No hernia.     Genitourinary: There is bleeding (lochia) in the vagina. Uterus is not tender.           Musculoskeletal: Normal range of motion and moves all extremeties.        Neurological: She is alert and oriented to person, place, and time.    Skin: Skin is warm and dry.    Psychiatric: She has a normal mood and affect. Her behavior is normal. Thought content normal.       Review of Systems

## 2024-09-11 NOTE — ANESTHESIA POSTPROCEDURE EVALUATION
Anesthesia Post Evaluation    Patient: Rosemary Montes De Oca    Procedure(s) Performed: Procedure(s) (LRB):   SECTION (N/A)    Final Anesthesia Type: spinal      Patient location during evaluation: labor & delivery  Patient participation: Yes- Able to Participate  Level of consciousness: awake and alert  Post-procedure vital signs: reviewed and stable  Pain management: adequate  Airway patency: patent    PONV status at discharge: No PONV  Anesthetic complications: no      Cardiovascular status: blood pressure returned to baseline  Respiratory status: unassisted and spontaneous ventilation  Hydration status: euvolemic  Follow-up not needed.              Vitals Value Taken Time   /98 09/10/24 2001   Temp 35.6 °C (96 °F) 09/10/24 1930   Pulse 57 09/10/24 2006   Resp 16 09/10/24 1815   SpO2 96 % 09/10/24 2006   Vitals shown include unfiled device data.      Event Time   Out of Recovery 20:15:00         Pain/Nico Score: Nico Score: 9 (9/10/2024  6:30 PM)

## 2024-09-11 NOTE — NURSING
Pt ambulated to restroom with standby assistance. VSS. Afebrile. Bleeding minimal. Due to void. Abdominal pressure dressing removed per orders.

## 2024-09-12 VITALS
RESPIRATION RATE: 14 BRPM | BODY MASS INDEX: 29.14 KG/M2 | DIASTOLIC BLOOD PRESSURE: 61 MMHG | HEART RATE: 67 BPM | WEIGHT: 180.56 LBS | SYSTOLIC BLOOD PRESSURE: 100 MMHG | OXYGEN SATURATION: 96 % | TEMPERATURE: 98 F

## 2024-09-12 PROCEDURE — 99238 HOSP IP/OBS DSCHRG MGMT 30/<: CPT | Mod: ,,, | Performed by: OBSTETRICS & GYNECOLOGY

## 2024-09-12 PROCEDURE — 25000003 PHARM REV CODE 250: Performed by: OBSTETRICS & GYNECOLOGY

## 2024-09-12 RX ORDER — IBUPROFEN 600 MG/1
600 TABLET ORAL EVERY 8 HOURS PRN
Qty: 30 TABLET | Refills: 0 | Status: SHIPPED | OUTPATIENT
Start: 2024-09-12

## 2024-09-12 RX ORDER — HYDROCODONE BITARTRATE AND ACETAMINOPHEN 5; 325 MG/1; MG/1
1 TABLET ORAL EVERY 6 HOURS PRN
Qty: 15 TABLET | Refills: 0 | Status: SHIPPED | OUTPATIENT
Start: 2024-09-12

## 2024-09-12 RX ORDER — DOCUSATE SODIUM 100 MG/1
100 CAPSULE, LIQUID FILLED ORAL 2 TIMES DAILY
Qty: 60 CAPSULE | Refills: 0 | Status: SHIPPED | OUTPATIENT
Start: 2024-09-12

## 2024-09-12 RX ADMIN — IBUPROFEN 800 MG: 400 TABLET ORAL at 05:09

## 2024-09-12 RX ADMIN — PRENATAL VITAMINS-IRON FUMARATE 27 MG IRON-FOLIC ACID 0.8 MG TABLET 1 TABLET: at 08:09

## 2024-09-12 RX ADMIN — DOCUSATE SODIUM 100 MG: 100 CAPSULE, LIQUID FILLED ORAL at 08:09

## 2024-09-12 RX ADMIN — FERROUS SULFATE TAB 325 MG (65 MG ELEMENTAL FE) 1 EACH: 325 (65 FE) TAB at 08:09

## 2024-09-12 NOTE — DISCHARGE INSTRUCTIONS
"Mother Self Care:    Activity: Avoid strenuous exercise and get adequate rest.  No driving until the physician consent given.  Emotional Changes: Most women find birth to be a time of great emotional upheaval.  Sense of loss, mood swings, fatigue, anxiety, and feeling "let down" are common.  If feelings worsen or last more than a week, call your physician.  Breast Care/Breastfeeding: Wear a bra for comfort.  Keep nipples dry and apply your own breast milk or lanolin cream as needed for soreness.  Engorgement can be relieved with warm, moist heat before feedings.  You may also take Ibuprofen.  Breast Care/Bottle Feeding: Wear support bra 24 hours a day for one week.  Avoid stimulation to breasts.  You may use ice packs for discomfort.  Massiel-Care/Vaginal Bleeding: Remember to use your massiel-bottle after urinating.  Your flow will change from red, to pink, to yellow/white color over a period of 2 weeks.  Menstruation will return in 3-8 weeks, or longer if breastfeeding.  Episiotomy Vaginal Delivery: Stitches will dissolve within 10 days to 3 weeks.  Warm baths, tucks, and dermoplast will promote healing.  Avoid bubble baths or strong soaps.   Section/Tubal Ligation: Keep incision clean and dry. You may shower, but avoid baths. Please continue to use Nozin twice a day for 7 days after surgery. Ensure you attend your 1 week post op visit to have your dressing removed. Use antibacterial soap to wash your entire body until directed otherwise by a Physician, it is very important to shower daily. If you become concerned about the way your incision site looks, please contact your providers office.   Sexual Activity/Pelvic Rest: No sexual activity, tampons, or douching until your physician gives you consent.  Diet: Continue to eat from the five basic food groups, including plenty of protein, fruits, vegetables, and whole grains.  Limit empty calories and high fat foods.  Drink enough fluids to satisfy thirst and add an " "extra 500 calories for breastfeeding.  Constipation/Hemorrhoids: Drink plenty of water.  You may take a stool softener or natural laxative (Metamucil). You may use tucks or hemorrhoid ointment and soak in a warm tub.    "What does help look like to you when you go home?"  "Is there any need that you anticipate that we may be able to assist with?"    CALL YOUR OB DOCTOR IF ANY OF THE FOLLOWING OCCURS:  *Heavy bleeding - saturating a pad an hour or passing any large (2-3 inches in size) blood clots.  *Any pain, redness, or tenderness in lower leg.  *You cannot care for yourself or your baby.  *Any signs of infection-      - Temperature greater than 100.5 degrees F      - Foul smelling vaginal discharge and/or incisional drainage      - Increased episiotomy or incisional pain      - Hot, hard, red or sore area on breast      - Flu-like symptoms      - Any urgency, frequency or burning with urination    Return To the Hospital for further Evaluation:  Headache not relieved by tylenol or ibuprofen  Blurry vision, double vision, seeing spots, or flashing lights  Feeling faint or passing out  Right epigastric pain  Difficulty breathing  Swelling in hands, face, or feet  Any of these symptoms accompanied by nausea/vomiting  Gaining more than 5 pounds in one week  Seizures  These symptoms could be an indication of elevated blood pressure.     For patients that were treated for high blood pressure during pregnancy and or your hospital stay you will need a blood pressure check three days after you leave the hospital. Your nursing staff will assist you in an appointment if needed. If you have Connected Mom you may use your blood pressure cuff and report any readings 140/90 to your provider immediately.       If you have any questions that need to be answered immediately please call the Labor & Delivery Unit at [phone] and ask to speak to a nurse.      Please see [facility] BLUE folder for additional information and handouts. "

## 2024-09-12 NOTE — ASSESSMENT & PLAN NOTE
POD # 2 s/p RLTCS  Pt is doing well.  Proceed with routine post-operative care.   Stable for discharge to home

## 2024-09-12 NOTE — LACTATION NOTE
Lactation Rounds:   Infant  x8 within the past 24 hours, voided x4 and stooled x2. No update weight as of this time.    Mother is currently breastfeeding infant on the right breast in cross cradle hold. Nutritive suck with swallows noted. Mother denies any pain or discomfort. Reviewed the expected feeding patterns and importance of cluster feeding to promote milk production. Reviewed that infant should be fed on demand, at least 8 times a day. Reviewed proper positioning, signs of good attachment, and effective milk transfer. Breast massages and hand expression encouraged to assist with cluster feeding. Stressed the significance of frequent and uninterrupted skin-to-skin contact with the infant.    Discussed the American Academy of Breastfeeding Medicine guidelines for oral daily intake and output:  Day 2: 5-15 mL per feeding, 2 voids, 2 stools  Day 3: 15-30 mL per feeding, 3 voids, 3 stools  Day 4: 30-45 mL per feeding, 4 voids, 3 stools    Mother denies any further lactation needs or concerns at this time. Encouraged mother to call for assistance when desired or when infant is showing signs of hunger. Mother verbalizes understanding of all education and counseling.

## 2024-09-12 NOTE — DISCHARGE SUMMARY
"[facility] - [facility]  Obstetrics  Discharge Summary      Patient Name: Rosemary Montes De Oca  MRN: [id number]  Admission Date: 9/10/2024  Hospital Length of Stay: 2 days  Discharge Date and Time:  2024 9:02 AM  Attending Physician: Malena Sandra MD   Discharging Provider: Montse Vo MD   Primary Care Provider: Genoveva, Primary Doctor    HPI: 29 y/o  at 38w6d; presents for assessment for possible  v's repeat  due to newly diagnosed Cholestasis        Procedure(s) (LRB):   SECTION (N/A)     Hospital Course:   Admit for repeat , Dr Sandra aware  Patient has been NPO  Cervix FT/50/V/-3, unable to place balloon   09/10/2024 --repeat c/section recommended  Pt underwent uncomplicated repeat LTCS.  She delivered a viable female infant.  Routine post-op course.  Stable for discharge to home on POD#2.         Final Active Diagnoses:    Diagnosis Date Noted POA    PRINCIPAL PROBLEM:  Status post repeat low transverse  section [Z98.891] 2024 Not Applicable    Cholestasis during pregnancy in third trimester [O26.643] 09/10/2024 Yes    Anemia affecting pregnancy in third trimester [O99.013] 2024 Yes      Problems Resolved During this Admission:        Significant Diagnostic Studies: Labs: CBC   Recent Labs   Lab 09/10/24  1622   WBC 9.56   HGB 12.5   HCT 37.6   *         Feeding Method: breast    Immunizations       None            Delivery:    Episiotomy:     Lacerations:     Repair suture:     Repair # of packets:     Blood loss (ml):       Birth information:  YOB: 2022   Time of birth: 5:31 PM   Sex: female   Delivery type: , Low Transverse   Gestational Age: 38w6d     Measurements    Weight: 3330 g  Weight (lbs): 7 lb 5.5 oz  Length: 50.8 cm  Length (in): 20"  Head circumference: 34.9 cm  Chest circumference: 34.3 cm  Abdominal girth: 32.4 cm         Delivery Clinician: Delivery Providers    Delivering clinician: Malena Sandra MD   " Provider Role    Sushila Buchanan, RN Registered Nurse    Yoana Restrepo RN Registered Nurse    Neema Leblanc, [facility] Assist    Diana Gaston, [facility] Tech    Michael Parker Jr., CRNA Nurse Anesthetist             Additional  information:  Forceps:    Vacuum:    Breech:    Observed anomalies      Living?:     Apgars    Living status: Living  Apgar Component Scores:  1 min.:  5 min.:  10 min.:  15 min.:  20 min.:    Skin color:  0  1       Heart rate:  2  2       Reflex irritability:  2  2       Muscle tone:  2  2       Respiratory effort:  2  2       Total:  8  9       Apgars assigned by: MAXI RESTREPO RN         Placenta: Delivered:       appearance  Pending Diagnostic Studies:       Procedure Component Value Units Date/Time    Specimen to Pathology, Surgery Gynecology and Obstetrics [[id number]] Collected: 09/10/24 1800    Order Status: Sent Lab Status: In process Updated: 24 0748    Specimen: Tissue             Discharged Condition: good    Disposition: Home or Self Care    Follow Up:   Follow-up Information       [facility] Follow [facility] in 1 week([facility]).    Why: Dressing removal             Malena Sandra MD Follow up in 4 week(s).    Specialty: Obstetrics and Gynecology  Why: Post-operative visit  Contact information:  [address] [place] [address] [phone]                           Patient Instructions:      BREAST PUMP FOR HOME USE     Order Specific Question Answer Comments   Type of pump: Hospital grade electric    Weight: 81.9 kg (180 lb 8.9 oz)    Length of need (1-99 months): 99      Diet Adult Regular     Lifting restrictions     Pelvic Rest     Notify your health care provider if you experience any of the following:  temperature >100.4     Notify your health care provider if you experience any of the following:  persistent nausea and vomiting or diarrhea     Notify your health care provider if you experience any of the following:  severe uncontrolled pain     Notify your  health care provider if you experience any of the following:  redness, tenderness, or signs of infection (pain, swelling, redness, odor or green/yellow discharge around incision site)     Notify your health care provider if you experience any of the following:  difficulty breathing or increased cough     Notify your health care provider if you experience any of the following:  severe persistent headache     Notify your health care provider if you experience any of the following:  worsening rash     Notify your health care provider if you experience any of the following:  persistent dizziness, light-headedness, or visual disturbances     Notify your health care provider if you experience any of the following:  increased confusion or weakness     Leave dressing on - Keep it clean, dry, and intact until clinic visit     Medications:  Current Discharge Medication List        START taking these medications    Details   docusate sodium (COLACE) 100 MG capsule Take 1 capsule (100 mg total) by mouth 2 (two) times daily.  Qty: 60 capsule, Refills: 0      HYDROcodone-acetaminophen (NORCO) 5-325 mg per tablet Take 1 tablet by mouth every 6 (six) hours as needed for Pain.  Qty: 15 tablet, Refills: 0    Comments: Quantity prescribed more than 7 day supply? No, diagnosis code Z98.891      ibuprofen (ADVIL,MOTRIN) 600 MG tablet Take 1 tablet (600 mg total) by mouth every 8 (eight) hours as needed for Pain.  Qty: 30 tablet, Refills: 0           CONTINUE these medications which have NOT CHANGED    Details   ferrous sulfate (IRON, FERROUS SULFATE,) 325 mg (65 mg iron) Tab tablet Take 1 tablet (325 mg total) by mouth once daily.  Qty: 30 tablet, Refills: 2    Associated Diagnoses: Anemia affecting pregnancy in third trimester      PNV no.153/FA/om3/dha/epa/fish (PRENATAL GUMMIES ORAL) Take by mouth.             Montse Vo MD  Obstetrics  [facility]

## 2024-09-12 NOTE — SUBJECTIVE & OBJECTIVE
Interval History:     She is doing well this morning. She is tolerating a regular diet without nausea or vomiting. She is voiding spontaneously. She is ambulating. She has passed flatus, and has a BM. Vaginal bleeding is mild. She denies fever or chills. Abdominal pain is mild and controlled with oral medications. She Is breastfeeding. She desires circumcision for her male baby: not applicable.    Objective:     Vital Signs (Most Recent):  Temp: 98 °F (36.7 °C) (09/12/24 0715)  Pulse: 67 (09/12/24 0712)  Resp: 14 (09/12/24 0715)  BP: 100/61 (09/12/24 0715)  SpO2: 96 % (09/12/24 0055) Vital Signs (24h Range):  Temp:  [98 °F (36.7 °C)-98.6 °F (37 °C)] 98 °F (36.7 °C)  Pulse:  [67-98] 67  Resp:  [14-16] 14  SpO2:  [96 %-100 %] 96 %  BP: (100-117)/(61-79) 100/61     Weight: 81.9 kg (180 lb 8.9 oz)  Body mass index is 29.14 kg/m².      Intake/Output Summary (Last 24 hours) at 9/12/2024 0859  Last data filed at 9/11/2024 1845  Gross per 24 hour   Intake --   Output 2400 ml   Net -2400 ml         Significant Labs:  Lab Results   Component Value Date    GROUPTRH O POS 09/10/2024    HEPBSAG Non-reactive 01/29/2024     Recent Labs   Lab 09/10/24  1622   HGB 12.5   HCT 37.6       I have personallly reviewed all pertinent lab results from the last 24 hours.    Physical Exam:   Constitutional: She is oriented to person, place, and time. She appears well-developed and well-nourished. No distress.       Cardiovascular:  Normal rate.      Exam reveals no clubbing and no cyanosis.        Pulmonary/Chest: Effort normal.        Abdominal: Soft. She exhibits abdominal incision (Aquasel dressing clean, dry, and intact). She exhibits no distension and no mass. There is no abdominal tenderness. There is no rebound and no guarding.   Uterine fundus firm, below the umbilicus, and non-tender             Musculoskeletal: Edema (1+ BL LE edema) present.       Neurological: She is alert and oriented to person, place, and time.    Skin: No  cyanosis. Nails show no clubbing.    Psychiatric: She has a normal mood and affect. Her behavior is normal. Judgment and thought content normal.       Review of Systems

## 2024-09-12 NOTE — LACTATION NOTE
Mother called for Lactation assistance.       SUBJECTIVE  Mother reports:  Infant has been feeding non-stop since 10 pm. Infant gets frustrated and irritable when not on the breast.   Infant voids and stools adequately.  Mother is tired and needs guidance.     OBJECTIVE  BREASTFEEDING    Breastfeeding    [x] Right breast   [x] Left breast                Position [x] cross cradle [] cradle [] football [] laid-back   Gape [x] adequate [] narrow []  []    Latch [] successful [] unsuccessful [x] required intervention []   Lip flange [x] top flanged [x] bottom flanged [] top tucked [] bottom tucked   Oral seal [] adequate [] poor [x] Weak    Cheeks [x] round [] dimpled [] broken cheek line [] flat   Jaw [] rocker [x] piston [] chomping [] fasciculations   Maternal pain [x] none [] mild [] moderate [] severe   Swallow [x] observed [] not observed []  []    Swallow rate [x] appropriate [] minimal [] none []    Difficulties [] none [] coughing [] choking [] gagging    [] clicking [] wet/hoarse/breathy vocal quality [] breathing difficulty or tachypnea [x] fatigue    [] smacking [x] weak/disorganized suck [x] infant inactive [] loss of milk    [x] pop-offs [] arching [x] Sleepy []    After feeding:     Maternal nipple  [x] WDL [] slightly compressed [] compressed  [] blanched   Baby after feeding [] content [x] feeding cues  [x] fussy [x] fatigued    [] N/A (feeding ongoing) []    Notes:  Sleepy infant needs waking stimulation throughout. Weak oral seal/suck, kept popping off breast. Suck exercises performed; elicited adequate/proper suck with chin support. Hand expressed colostrum 2.5 mL and syringed fed to infant, she tolerated well.         ASSESSMENT FINDINGS    Ineffective breastfeeding due to improper suck and weak oral seal.   Ineffective breastfeeding due to sleepiness.      PLAN  Mother plans to breastfeed exclusively.    Standard breastfeeding plan: Breastfeeding education  Teach/perform suck  "exercises  Teach/perform chin support technique  Teach/perform waking techniques  Teach/perform hand expression technique       EDUCATION    Discussed signs of cluster feeding and its importance to promote milk production. Discussed that infant should be fed on demand, at least 8 times a day. Discussed proper positioning, signs of good attachment, and effective milk transfer. Suck exercise, chin support and waking techniques, and hand expression discussed, encouraged and performed. Mother effectively return demonstrated and verbalized understanding.     Standard Breastfeeding Education:  Feed your baby on demand and at least 8 times in a 24-hour period.  Signs that your baby is ready to feed are:  "Rooting" or turning of the head in search of the breast  Bobbing head when you are holding them close to your chest  Opening and closing the mouth or smacking the lips  Sucking on the fingers or hands  If infant has not fed in 3 hours, unswaddle and undress them and do skin to skin. If infant does not wake within the hour, call for assistance with latching.   Alternate the starting breast with each feeding, whether baby takes both breasts or not. Your baby may not always take the second side, but it should be offered.   Massage/compression of breast while nursing will help to increase milk transfer.  Breastfeeding should not be painful. Ensure you are getting an adequate latch and call your lactation consultant as needed.     Mother was taught hand expression of breastmilk/colostrum. She was instructed to:  Sit upright and lean forward, if possible.  When feasible, apply warm, wet compress over breasts for a few minutes.   Perform gentle breast massage.  Form a C with her hand and place it about 1 inch back from the areola with the nipple centered between her index finger and her thumb.  Press, compress, relax:  Using her finger and thumb, apply pressure in an inward direction toward the breast without stretching the " tissue, compress the breast tissue between her finger and thumb, then relax her finger and thumb. Repeat process for a few minutes.  Rotate placement of finger and thumb on the breasts to facilitate emptying.  Collect expressed breastmilk/colostrum with a spoon or cup and feed immediately to the baby, if able.  If unable to feed immediately, place breastmilk/colostrum directly into a sterile storage container for later use. Place the babys breast milk label (with the date and time of collection and the names of mother's medications) on the container. Reviewed proper handling and storage of expressed breastmilk.   Patient effectively return demonstrated and verbalized understanding. Mother and I collected 2.5 mL colostrum. I syringe fed it to infant while mother eats her snack. Infant tolerated well and calmed.       Discharge Education:   Mother anticipates discharge home today. Reviewed signs of good attachment. Reviewed breast massage and compression during feedings and indications for use. Reviewed signs of effective milk transfer and instructed to call pediatrician and lactation if signs not present.    Signs of good a good latch:    -  Infant is calm and sucking contentedly    -  Infants mouth covering a large part  of the areola   -  Chin indents breast     -  Lips flanged outward    -  Wide mouth corner angle    -  Rhythmic sucking and audible swallow    -  Rounded cheek and no dimpling    -  Infant maintains latch     -  The latch is comfortable and pain-free            Discussed expected feeding and output pattern for days of life 2, 3, 4, & 5+; mother instructed to call pediatrician and lactation if infant is not meeting feeding and output goals. Expected oral intake per feeding (according to American Academy of Breastfeeding Medicine) & expected output for each day of life:   Day 2: 5-15 mL per feeding, 2 voids, 2 stools   Day 3: 15-30 mL per feeding, 3 voids, 3 stools   Day 4: 30-60 mL per feeding, 4  voids, 3 stools   After the 4th day and your milk is in:   a. Babys stool should turn bright yellow and be loose, watery, and seedy   b. Baby should have at least 3-4 poops the size of the palm of your hand per day   c. Baby should have at least 5-6 wet diapers per day   d. Babys urine should be light yellow in color     Reviewed signs of engorgement and expectant management. Reviewed signs of mastitis and instructed mother to call OB provider and lactation if any signs present. Reviewed proper milk handling, collection, and storage guidelines. Reviewed nursing diet and nutrition. Discussed resources for medication safety while breastfeeding. Reviewed available outpatient lactation resources.    Discussed proper use of First Alert Form. These questions should be reviewed once your baby is 5 days old and any time afterward. This questionnaire helps you to determine how well you and the baby are breastfeeding and may alert you to any real or potential breastfeeding problems. All answers should land in column A. If any answers are in column B, please call the Lactation Warmline for assistance at [phone].      Avoid pacifiers and bottles for the first 4 weeks to aid in establishing your milk supply. Your baby should be examined by a pediatrician at 3-5 days of age and again at 2 weeks of age.  Once your milk production increases, the baby should be gaining at least ½ - 1oz each day and should be back to birthweight no later than 10-14 days of age.  If this is not the case, please call the Breastfeeding Warmline [phone] for assistance and support. Satisfy hunger and thirst needs by eating a healthy diet when hungry and by staying hydrated. Take naps as needed and rest when your infant is sleeping.       Your Guide to Postpartum &  Care has QR codes throughout. A QR code is a black and white square barcode that can contain information such as a web link for detailed information or video and can be  scanned easily by a smartphone camera.    - Making Milk: page 36   - Feeding cues: page 36   - Laid-Back Latch Position: page 37   - Medication and Drug: page 41   - Hand Expression: page 42      Mother has a Spectra S2 breast pump for home use. Discussed pumping indication as needed to stay comfortable and to help boost infant's infant with help of family members.     Breastfeeding Videos:    - [employer] - [employer]      Attaching Your Baby at the Breast - Video - [employer] Project   It teaches and empowers mothers and caregivers.       Community Resources   - [facility] Breastfeeding Warmline: [phone]. To schedule an outpatient lactation consultation: [phone]   - Infant Risk Center: Is a call center that provides information about the safety of taking medications while breastfeeding. Call [phone], M-F, 8am-5pm, [place].   - [facility] [employer] mother-to-mother support groups: [place], and [place] ([employer]org)         - Partners for Healthy Babies:  6-952-177-BABY (0897)   - [employer] [facility] [place]: (247) [phone]-MILK (9980) [email or web address].[email or web address]/service/[email or web address]-milk-[email or web address]    Mothers Milk Bank [facility]   For obtaining donor milk or donating milk.     Mother verbalizes understanding of all education and counseling; she denies any further lactation needs or concerns. Encouraged mother to contact lactation with any questions, concerns, or problems, contact number provided.

## 2024-09-12 NOTE — LACTATION NOTE
Checked in with mom before discharge. Knows how and when to get in touch with lactation if needed. Has a pump at home, Spectra, and ordered a Motif Deisi from insurance. Mother verbalizes understanding of all education and counseling. Mother denies any further lactation needs or concerns at this time. Discussed lactation availability. Encouraged mother to call for assistance when needs arise.

## 2024-09-12 NOTE — PLAN OF CARE
POC reviewed with patient and family; verbalized understanding. Mother responding well to infant cues. Bleeding minimal. Incision and dressing CDI. VSS. Care ongoing.

## 2024-09-12 NOTE — PROGRESS NOTES
[facility]  Obstetrics  Postpartum Progress Note    Patient Name: Rosemary Montes De Oca  MRN: [id number]  Admission Date: 9/10/2024  Hospital Length of Stay: 2 days  Attending Physician: Malena Sandra MD  Primary Care Provider: Genoveva, Primary Doctor    Subjective:     Principal Problem:Status post repeat low transverse  section    Hospital Course:  Admit for repeat , Dr Sandra aware  Patient has been NPO  Cervix FT/50/V/-3, unable to place balloon   09/10/2024 --repeat c/section recommended  Pt underwent uncomplicated repeat LTCS.  She delivered a viable female infant.  Routine post-op course.  Stable for discharge to home on POD#2.    Interval History:     She is doing well this morning. She is tolerating a regular diet without nausea or vomiting. She is voiding spontaneously. She is ambulating. She has passed flatus, and has a BM. Vaginal bleeding is mild. She denies fever or chills. Abdominal pain is mild and controlled with oral medications. She Is breastfeeding. She desires circumcision for her male baby: not applicable.    Objective:     Vital Signs (Most Recent):  Temp: 98 °F (36.7 °C) (24)  Pulse: 67 (24)  Resp: 14 (24)  BP: 100/61 (24)  SpO2: 96 % (24 0055) Vital Signs (24h Range):  Temp:  [98 °F (36.7 °C)-98.6 °F (37 °C)] 98 °F (36.7 °C)  Pulse:  [67-98] 67  Resp:  [14-16] 14  SpO2:  [96 %-100 %] 96 %  BP: (100-117)/(61-79) 100/61     Weight: 81.9 kg (180 lb 8.9 oz)  Body mass index is 29.14 kg/m².      Intake/Output Summary (Last 24 hours) at 2024 0859  Last data filed at 2024 1845  Gross per 24 hour   Intake --   Output 2400 ml   Net -2400 ml         Significant Labs:  Lab Results   Component Value Date    GROUPTRH O POS 09/10/2024    HEPBSAG Non-reactive 2024     Recent Labs   Lab 09/10/24  1622   HGB 12.5   HCT 37.6       I have personallly reviewed all pertinent lab results from the last 24 hours.    Physical Exam:    Constitutional: She is oriented to person, place, and time. She appears well-developed and well-nourished. No distress.       Cardiovascular:  Normal rate.      Exam reveals no clubbing and no cyanosis.        Pulmonary/Chest: Effort normal.        Abdominal: Soft. She exhibits abdominal incision (Aquasel dressing clean, dry, and intact). She exhibits no distension and no mass. There is no abdominal tenderness. There is no rebound and no guarding.   Uterine fundus firm, below the umbilicus, and non-tender             Musculoskeletal: Edema (1+ BL LE edema) present.       Neurological: She is alert and oriented to person, place, and time.    Skin: No cyanosis. Nails show no clubbing.    Psychiatric: She has a normal mood and affect. Her behavior is normal. Judgment and thought content normal.       Review of Systems  Assessment/Plan:     28 y.o. female  for:    * Status post repeat low transverse  section  POD # 2 s/p RLTCS  Pt is doing well.  Proceed with routine post-operative care.   Stable for discharge to home    Cholestasis during pregnancy in third trimester  Admit for repeat     Anemia affecting pregnancy in third trimester  Iron supplement as indicated  2024  Hgb 12.5 preop        Disposition: As patient meets milestones, will plan to discharge today.    Montse Vo MD  Obstetrics  [facility]

## 2024-09-13 LAB
FINAL PATHOLOGIC DIAGNOSIS: NORMAL
GROSS: NORMAL
Lab: NORMAL

## 2024-09-16 NOTE — PROGRESS NOTES
OBGYN Post-op Clinic  History and Physical    Patient Name: Rosemary Montes De Oca  YOB: 1995 (28 y.o.)  MRN: [id number]  Today's Date: 2024    Referring Md:   No referring provider defined for this encounter.    SUBJECTIVE:     Chief Complaint: Post-op  Dressing Removal    History of Present Illness:  Rosemary Montes De Oca is a 28 y.o. female  who presents to the clinic today for acquacel dressing removal and BP check. S/p  on 9/10/24. Takes no medications for blood pressure. Reports that she is feeling well overall and not requiring pain medication. Ambulating well, no acute concerns.       Review of patient's allergies indicates:  No Known Allergies    No past medical history on file.  Past Surgical History:   Procedure Laterality Date     SECTION       SECTION N/A 9/10/2024    Procedure:  SECTION;  Surgeon: Malena Sandra MD;  Location: [facility] L&D;  Service: OB/GYN;  Laterality: N/A;     Family History   Problem Relation Name Age of Onset    Diabetes Paternal Grandmother      Heart failure Paternal Grandmother      Cancer Maternal Grandmother       Social History     Tobacco Use    Smoking status: Never    Smokeless tobacco: Never   Substance Use Topics    Alcohol use: Not Currently    Drug use: Never        Review of Systems:  Review of Systems   Constitutional:  Negative for chills and fever.   HENT:  Negative for congestion and sore throat.    Respiratory:  Negative for cough and shortness of breath.    Cardiovascular:  Negative for chest pain and leg swelling.   Gastrointestinal:  Negative for constipation, diarrhea, nausea and vomiting.   Genitourinary:  Negative for dysuria.   Musculoskeletal:  Negative for myalgias.   Skin:  Negative for rash.   Neurological:  Negative for weakness and headaches.     OBJECTIVE:     Vital Signs (Most Recent)  [facility] 12/15/2023     Physical Exam  Vitals and nursing note reviewed.   Constitutional:       General: She is not  in acute distress.     Appearance: Normal appearance.   HENT:      Head: Normocephalic and atraumatic.   Eyes:      General: No scleral icterus.        Right eye: No discharge.         Left eye: No discharge.      Conjunctiva/sclera: Conjunctivae normal.   Pulmonary:      Effort: Pulmonary effort is normal. No respiratory distress.   Abdominal:      Palpations: Abdomen is soft.      Tenderness: There is abdominal tenderness (Appropriately TTP).      Comments: Aquacel dressing over Pfannenstiel incision removed in clinic. Incision intact with no erythema, warmth, or drainage.    Musculoskeletal:         General: Normal range of motion.      Cervical back: Normal range of motion.      Right lower leg: No edema.      Left lower leg: No edema.   Skin:     General: Skin is warm and dry.   Neurological:      General: No focal deficit present.      Mental Status: She is alert and oriented to person, place, and time.   Psychiatric:         Mood and Affect: Mood normal.         Behavior: Behavior normal.         Thought Content: Thought content normal.         Judgment: Judgment normal.         ASSESSMENT/PLAN:     Rosemary Montes De Oca is a 28 y.o. female was seen today for dressing removal and blood pressure check. Doing well, no acute concerns.   I insured the patient has a scheduled post-op visit. I reviewed all restrictions and limitations with the patient including lifting/activity restrictions, pelvic rest, and showering daily (no tub baths/soaking in water of any kind) with antibacterial soap. Instructed patient to call clinic with any concerning issues or symptoms. Patient verbalized understanding.     Diagnoses and all orders for this visit:    Status post repeat low transverse  section         - Showers only, pelvic rest, and no heavy lifting/strenuous activity for 4-6 weeks       - Ibuprofen PRN for pain       - Keep f/u appointment with surgeon in a few weeks        Christiana Camp, [facility], PAHuaC  OBGYN -  Surgery  [facility]

## 2024-09-17 ENCOUNTER — POSTPARTUM VISIT (OUTPATIENT)
Dept: OBSTETRICS AND GYNECOLOGY | Facility: CLINIC | Age: 29
End: 2024-09-17
Payer: MEDICAID

## 2024-09-17 VITALS — SYSTOLIC BLOOD PRESSURE: 110 MMHG | HEIGHT: 66 IN | DIASTOLIC BLOOD PRESSURE: 60 MMHG | BODY MASS INDEX: 29.14 KG/M2

## 2024-09-17 DIAGNOSIS — Z98.891 STATUS POST REPEAT LOW TRANSVERSE CESAREAN SECTION: Primary | ICD-10-CM

## 2024-09-17 PROCEDURE — 99213 OFFICE O/P EST LOW 20 MIN: CPT | Mod: PBBFAC | Performed by: PHYSICIAN ASSISTANT

## 2024-09-17 PROCEDURE — 99999 PR PBB SHADOW E&M-EST. PATIENT-LVL III: CPT | Mod: PBBFAC,,, | Performed by: PHYSICIAN ASSISTANT

## 2024-10-01 ENCOUNTER — E-VISIT (OUTPATIENT)
Dept: OBSTETRICS AND GYNECOLOGY | Facility: CLINIC | Age: 29
End: 2024-10-01
Payer: MEDICAID

## 2024-10-01 DIAGNOSIS — Z98.891 STATUS POST REPEAT LOW TRANSVERSE CESAREAN SECTION: Primary | ICD-10-CM

## 2024-10-01 NOTE — PROGRESS NOTES
Patient ID: Rosemary Montes De Oca is a 29 y.o. female.    Chief Complaint: General Illness (Entered automatically based on patient selection in [employer].)    The patient initiated a request through [employer] on 10/1/2024 for evaluation and management with a chief complaint of General Illness (Entered automatically based on patient selection in [employer].)     I evaluated the questionnaire submission on 10/1/24. Replied to patient via [employer]. Vaginal bleeding can still be expected at this time, but if patient begins soaking a pad or more per hour will need to come in to the clinic to be seen.     [facility]    10/1/2024 10:22 AM CDT - Filed by Patient   What do you need help with? Vaginal Concerns   Do you agree to participate in an E-Visit? Yes   If you have any of the following symptoms,  please do not complete an E-Visit,  schedule an appointment with your provider: I acknowledge   Are you pregnant, could you be pregnant, or are you breast feeding? Breast feeding   What is the main issue you would like addressed today? Postpartum bleeding   Which of the following vaginal concerns do you have? Bleeding   Do you have pain while passing urine? No   Do you have any of the following symptoms? None of the above    Have you taken antibiotics in the last two weeks? No    Do you use any of the following? No   Which of the following applies to your menstrual period? Have not had for a while   Which of the following applies to your menstrual cycle? Normal amount of bleeding   Do you have spotting between periods? No   Do you have pain with your period? No   Have you had similar symptoms in the past? No   Have you had a temperature of 100.4 or higher? No   Provide any additional information you feel is important. At 2 weeks PP I stopped bleeding. I did not bleed for 1 wk. Now the bleeding has started again. Very heavy but dark red/brown. Is that normal?   Please attach any relevant images or files    Are you able to  take your vital signs? No         Encounter Diagnosis   Name Primary?    Status post repeat low transverse  section Yes        No orders of the defined types were placed in this encounter.           No follow-ups on file.      E-Visit Time Tracking:    Day 1 Time (in minutes): 10    Total Time (in minutes): 10

## 2024-10-25 ENCOUNTER — POSTPARTUM VISIT (OUTPATIENT)
Dept: OBSTETRICS AND GYNECOLOGY | Facility: CLINIC | Age: 29
End: 2024-10-25
Payer: MEDICAID

## 2024-10-25 VITALS
BODY MASS INDEX: 24.34 KG/M2 | WEIGHT: 151.44 LBS | SYSTOLIC BLOOD PRESSURE: 108 MMHG | DIASTOLIC BLOOD PRESSURE: 60 MMHG | HEIGHT: 66 IN

## 2024-10-25 DIAGNOSIS — Z12.4 SCREENING FOR CERVICAL CANCER: ICD-10-CM

## 2024-10-25 PROCEDURE — 99213 OFFICE O/P EST LOW 20 MIN: CPT | Mod: PBBFAC,PN | Performed by: OBSTETRICS & GYNECOLOGY

## 2024-10-25 PROCEDURE — 99999 PR PBB SHADOW E&M-EST. PATIENT-LVL III: CPT | Mod: PBBFAC,,, | Performed by: OBSTETRICS & GYNECOLOGY

## 2024-10-25 NOTE — PROGRESS NOTES
Subjective:       Patient ID: Rosemary Montes De Oca is a 29 y.o. female.    Chief Complaint:  Postpartum Care      History of Present Illness  HPI  Postoperative Follow-up  Patient presents to the clinic 5 weeks status post  for  elective repeat; cholestasis; not in labor;  . Eating a regular diet without difficulty. Bowel movements are normal. The patient is not having any pain.  Breast feeding  Minimal lochia  Emotionally well  Plans NFP  GYN & OB History  Patient's last menstrual period was 12/15/2023.   Date of Last Pap: No result found    OB History    Para Term  AB Living   2 2 2     2   SAB IAB Ectopic Multiple Live Births         0 2      # Outcome Date GA Lbr William/2nd Weight Sex Type Anes PTL Lv   2 Term 09/10/24 38w6d  3.33 kg (7 lb 5.5 oz) F CS-LTranv Spinal N KIMMIE   1 Term 22    F CS-Unspec         Complications: Cholestasis, Breech presentation       Review of Systems  Review of Systems   All other systems reviewed and are negative.          Objective:      Physical Exam:   Constitutional: She appears well-developed.     Eyes: Pupils are equal, round, and reactive to light. Conjunctivae and EOM are normal.      Pulmonary/Chest: Effort normal.        Abdominal: Soft.     Genitourinary:    Inguinal canal, urethra, bladder, uterus, right adnexa, left adnexa and rectum normal.      Pelvic exam was performed with patient prone.     Labial bartholins normal.Cervix is normal. Cervix is absent.Normal urethral meatus.          Musculoskeletal: Normal range of motion.       Neurological: She is alert.    Skin: Skin is warm.    Psychiatric: She has a normal mood and affect.           Assessment:        1. Postpartum care following  delivery    2. Screening for cervical cancer               Plan:      Released from gyn care  Pap today; Reviewed updated recommendations for pap smears (every 3 years) in low risk patients.   Recommend annual pelvic exams.  Reviewed recommendations for  annual CBE.  Add lubricant if needed  Continue prenatal vitamin

## 2025-06-04 NOTE — NURSING
"Patient Discharge Education completed. Verbalized understanding of signs and symptoms.    Activity: Avoid strenuous exercise and get adequate rest.  No driving until the physician consent given.  Emotional Changes: Most women find birth to be a time of great emotional upheaval.  Sense of loss, mood swings, fatigue, anxiety, and feeling "let down" are common.  If feelings worsen or last more than a week, call your physician.  Breast Care/Breastfeeding: Wear a bra for comfort.  Keep nipples dry and apply your own breast milk or lanolin cream as needed for soreness.  Engorgement can be relieved with warm, moist heat before feedings.  You may also take Ibuprofen.  Breast Care/Bottle Feeding: Wear support bra 24 hours a day for one week.  Avoid stimulation to breasts.  You may use ice packs for discomfort.  Massiel-Care/Vaginal Bleeding: Remember to use your massiel-bottle after urinating.  Your flow will change from red, to pink, to yellow/white color over a period of 2 weeks.  Menstruation will return in 3-8 weeks, or longer if breastfeeding.   Section/Tubal Ligation: Keep incision clean and dry. You may shower, but avoid baths. Please continue to use Nozin twice a day for 7 days after surgery. Ensure you attend your 1 week post op visit to have your dressing removed. Use antibacterial soap to wash your entire body until directed otherwise by a Physician, it is very important to shower daily. If you become concerned about the way your incision site looks, please contact your providers office.   Sexual Activity/Pelvic Rest: No sexual activity, tampons, or douching until your physician gives you consent.  Diet: Continue to eat from the five basic food groups, including plenty of protein, fruits, vegetables, and whole grains.  Limit empty calories and high fat foods.  Drink enough fluids to satisfy thirst and add an extra 500 calories for breastfeeding.  Constipation/Hemorrhoids: Drink plenty of water.  You may take a " Dietary Instructions for chronic diarrhea    Eat a high fiber diet rich in vegetables, fruit   Eat a large green salad every day  Avoid white bread, white pasta and white rice  2.  Drink 2 quarts (8 cups) of water daily  3. Take Metamucil (psyllium husk) 2-3 capsules twice daily  4.  Avoid lactose foods (milk, ice cream, soft cheese) and high-fructose foods (sweetened soda and candy)  5.  Avoid greasy foods (fried and sauteed)      Mediterranean diet     Can eat as often as daily Can eat up to   2-3x/week Eat with moderation  (up to x1/week) Better to avoid altogether           Protein Fatty fish  Eggs  Nuts (walnuts, pistachios, macadamians) Poultry,  Aged cheese, Yoghurt (preferably from goats and sheep) Grassfed beef, pork, lamb, prosciutto,  Whole milk Lunch meats  Peanuts, cashews   Fat Extra virgin olive oil  (no frying or pineda) Avocado oil  Coconut oil  (no frying or pineda) Butter,  Animal fat  (no frying or pineda)   Margarine,  Vegetable oil  Fried or browned foods   Carbohydrates Vegetables  Fruit  Okra  Sourdough bread,  sweet potatoes, yams Legumes (only pressure cooked),    White flour, Whole grain cereals, rice, corn, potatoes  High fructose corn syrup, cane sugar       Drinks Mountain tea, green tea, chamomile, mint tea, coffee (x1-2/day)  Red wine (d8begiq/day) Squeezed fruit juice with pulp  Soda  Coke  Liquor  Energy drinks     Avoid all foods with trans fatty acids including: processed and packaged baked foods like pastries, cookies, crackers, fried foods, frozen meals, frozen pizza, microwave popcorn, non-dairy creamer, margarine, shortening, vegetable oils. These foods increase the risk for fatty liver and diabetes.  Avoid foods and soft drinks with high fructose corn syrup. These foods increase the risk for fatty liver and diabetes    Recommend frequent consumption of fatty fish (that should be low in mercury) such as : salmon, cod, sardines  Recommend harder aged cheeses and have been  "stool softener or natural laxative (Metamucil). You may use tucks or hemorrhoid ointment and soak in a warm tub.    "What does help look like to you when you go home?"  "Is there any need that you anticipate that we may be able to assist with?"    CALL YOUR OB DOCTOR IF ANY OF THE FOLLOWING OCCURS:  *Heavy bleeding - saturating a pad an hour or passing any large (2-3 inches in size) blood clots.  *Any pain, redness, or tenderness in lower leg.  *You cannot care for yourself or your baby.  *Any signs of infection-      - Temperature greater than 100.5 degrees F      - Foul smelling vaginal discharge and/or incisional drainage      - Increased episiotomy or incisional pain      - Hot, hard, red or sore area on breast      - Flu-like symptoms      - Any urgency, frequency or burning with urination    Return To the Hospital for further Evaluation:  Headache not relieved by tylenol or ibuprofen  Blurry vision, double vision, seeing spots, or flashing lights  Feeling faint or passing out  Right epigastric pain  Difficulty breathing  Swelling in hands, face, or feet  Any of these symptoms accompanied by nausea/vomiting  Gaining more than 5 pounds in one week  Seizures  These symptoms could be an indication of elevated blood pressure.     If you have Connected Mom you may use your blood pressure cuff and report any readings 140/90 to your provider immediately.       If you have any questions that need to be answered immediately please call the Labor & Delivery Unit at [phone] and ask to speak to a nurse.      Please see [facility] folder for additional information and handouts.       " fermented for at least 6 months.  Examples include hard cheddar cheese, parmiggiano-reggiano cheese, manchego cheese, pecorino cheese  Recommend extra virgin olive oil daily (but it has to be extra and virgin)  Recommend daily leafy green salads with cabbage, spinach, kale, watercress, lettuce, arugula, radicchio, peppermint, dill, broccoli, cauliflower, asparagus, artichoke.  Minimize nightshade vegetables such as tomatoes, cucumbers, peppers, eggplant, potatoes  Recommend the following spices on food and salads: oregano, cumin, theo, thyme, basil, rosemary, clove  Recommend a variety of fruit. Berries score very high in antioxidants.  Recommend a variety of nuts; the best ones are walnuts, pecans, pistachios, macadamia nuts, pine nuts.  Almonds should preferably be blanched.  Peanuts and cashews are better to be avoided altogether  Recommend daily serving of fermented foods such as sauerkraut, kimchi, Greek yogurt, kefir, balsamic vinegar  Avoid artificial sweeteners such as sorbitol, xylitol, Splenda etc. Acceptable sweeteners only include: allulose, erythritol, Stevia, monk fruit    Recommend intermittent fasting with 1st meal at 12 noon and last meal at 6:00 p.m.

## 2025-06-12 ENCOUNTER — TELEPHONE (OUTPATIENT)
Dept: OBSTETRICS AND GYNECOLOGY | Facility: CLINIC | Age: 30
End: 2025-06-12
Payer: MEDICAID

## 2025-06-12 NOTE — TELEPHONE ENCOUNTER
Copied from CRM #[id number]. Topic: General Inquiry - Patient Advice  >> Jun 12, 2025  3:17 PM Martha wrote:  Type:  Needs Medical Advice    Who Called: Rosemary Montes De Oca   Would the patient rather a call back or a response via [facility]? Call back  Best Call Back Number: [phone]  Additional Information: Arcelia is calling to make sure the form from [facility] [employer] [facility] was received, signed and returned. She is wanted to donate her breast milk as soon as possible.

## 2025-06-12 NOTE — TELEPHONE ENCOUNTER
Left message for patient to return call to [phone].    No form from [employer] in patient's chart.  Where and to what number was the fax sent?

## 2025-06-13 ENCOUNTER — PATIENT MESSAGE (OUTPATIENT)
Dept: OBSTETRICS AND GYNECOLOGY | Facility: CLINIC | Age: 30
End: 2025-06-13
Payer: MEDICAID

## 2025-08-11 ENCOUNTER — OFFICE VISIT (OUTPATIENT)
Dept: URGENT CARE | Facility: CLINIC | Age: 30
End: 2025-08-11
Payer: MEDICAID

## 2025-08-11 ENCOUNTER — TELEPHONE (OUTPATIENT)
Dept: OBSTETRICS AND GYNECOLOGY | Facility: CLINIC | Age: 30
End: 2025-08-11
Payer: MEDICAID

## 2025-08-11 ENCOUNTER — NURSE TRIAGE (OUTPATIENT)
Dept: ADMINISTRATIVE | Facility: CLINIC | Age: 30
End: 2025-08-11
Payer: MEDICAID

## 2025-08-11 DIAGNOSIS — N61.0 MASTITIS: Primary | ICD-10-CM

## 2025-08-11 PROCEDURE — 98005 SYNCH AUDIO-VIDEO EST LOW 20: CPT | Mod: 95,,, | Performed by: NURSE PRACTITIONER

## 2025-08-11 RX ORDER — DICLOXACILLIN SODIUM 500 MG/1
500 CAPSULE ORAL EVERY 6 HOURS
Qty: 40 CAPSULE | Refills: 0 | Status: SHIPPED | OUTPATIENT
Start: 2025-08-11 | End: 2025-08-21

## (undated) DEVICE — NEPTUNE 4 PORT MANIFOLD

## (undated) DEVICE — DRESSING AQUACEL AG 3.5X10IN

## (undated) DEVICE — TAPE SURG MEDIPORE 6X72IN

## (undated) DEVICE — PAD ABDOMINAL STERILE 8X10IN

## (undated) DEVICE — TUBING NEPTUNE 2 SMOKE 10IN

## (undated) DEVICE — TAPE SILK 3IN